# Patient Record
Sex: FEMALE | Race: WHITE | NOT HISPANIC OR LATINO | Employment: OTHER | URBAN - METROPOLITAN AREA
[De-identification: names, ages, dates, MRNs, and addresses within clinical notes are randomized per-mention and may not be internally consistent; named-entity substitution may affect disease eponyms.]

---

## 2018-05-22 DIAGNOSIS — I20.89 ANGINA EFFORT: Primary | ICD-10-CM

## 2018-05-23 ENCOUNTER — DOCUMENTATION ONLY (OUTPATIENT)
Dept: CARDIOLOGY | Facility: CLINIC | Age: 68
End: 2018-05-23

## 2018-05-23 ENCOUNTER — OFFICE VISIT (OUTPATIENT)
Dept: CARDIOLOGY | Facility: CLINIC | Age: 68
End: 2018-05-23
Payer: MEDICARE

## 2018-05-23 ENCOUNTER — INITIAL CONSULT (OUTPATIENT)
Dept: RADIATION ONCOLOGY | Facility: CLINIC | Age: 68
End: 2018-05-23
Payer: MEDICARE

## 2018-05-23 VITALS
WEIGHT: 189.63 LBS | DIASTOLIC BLOOD PRESSURE: 60 MMHG | OXYGEN SATURATION: 99 % | HEIGHT: 62 IN | SYSTOLIC BLOOD PRESSURE: 98 MMHG | BODY MASS INDEX: 34.89 KG/M2 | HEART RATE: 67 BPM

## 2018-05-23 VITALS
DIASTOLIC BLOOD PRESSURE: 60 MMHG | HEIGHT: 62 IN | BODY MASS INDEX: 34.78 KG/M2 | SYSTOLIC BLOOD PRESSURE: 98 MMHG | HEART RATE: 67 BPM | RESPIRATION RATE: 20 BRPM | WEIGHT: 189 LBS

## 2018-05-23 DIAGNOSIS — K21.9 GASTROESOPHAGEAL REFLUX DISEASE WITHOUT ESOPHAGITIS: ICD-10-CM

## 2018-05-23 DIAGNOSIS — I10 ESSENTIAL HYPERTENSION: ICD-10-CM

## 2018-05-23 DIAGNOSIS — I25.118 CORONARY ARTERY DISEASE OF NATIVE ARTERY OF NATIVE HEART WITH STABLE ANGINA PECTORIS: ICD-10-CM

## 2018-05-23 DIAGNOSIS — E78.5 DYSLIPIDEMIA: ICD-10-CM

## 2018-05-23 DIAGNOSIS — T82.855A CORONARY STENT RESTENOSIS, INITIAL ENCOUNTER: ICD-10-CM

## 2018-05-23 DIAGNOSIS — E11.8 TYPE 2 DIABETES MELLITUS WITH COMPLICATION, WITHOUT LONG-TERM CURRENT USE OF INSULIN: ICD-10-CM

## 2018-05-23 DIAGNOSIS — R07.9 CHEST PAIN, UNSPECIFIED TYPE: Primary | ICD-10-CM

## 2018-05-23 DIAGNOSIS — R06.00 DYSPNEA, UNSPECIFIED TYPE: ICD-10-CM

## 2018-05-23 PROBLEM — E11.9 TYPE 2 DIABETES MELLITUS, WITHOUT LONG-TERM CURRENT USE OF INSULIN: Status: ACTIVE | Noted: 2018-05-23

## 2018-05-23 PROCEDURE — 99202 OFFICE O/P NEW SF 15 MIN: CPT | Mod: S$PBB,,, | Performed by: RADIOLOGY

## 2018-05-23 PROCEDURE — 99204 OFFICE O/P NEW MOD 45 MIN: CPT | Mod: S$PBB,,, | Performed by: INTERNAL MEDICINE

## 2018-05-23 PROCEDURE — 99999 PR PBB SHADOW E&M-EST. PATIENT-LVL II: CPT | Mod: PBBFAC,,, | Performed by: RADIOLOGY

## 2018-05-23 PROCEDURE — 99999 PR PBB SHADOW E&M-EST. PATIENT-LVL III: CPT | Mod: PBBFAC,,, | Performed by: INTERNAL MEDICINE

## 2018-05-23 PROCEDURE — 99212 OFFICE O/P EST SF 10 MIN: CPT | Mod: PBBFAC | Performed by: RADIOLOGY

## 2018-05-23 PROCEDURE — 99213 OFFICE O/P EST LOW 20 MIN: CPT | Mod: PBBFAC,27 | Performed by: INTERNAL MEDICINE

## 2018-05-23 RX ORDER — GABAPENTIN 300 MG/1
300 CAPSULE ORAL 3 TIMES DAILY PRN
COMMUNITY

## 2018-05-23 RX ORDER — GLUC/MSM/COLGN2/HYAL/ANTIARTH3 375-375-20
TABLET ORAL
COMMUNITY

## 2018-05-23 RX ORDER — METOPROLOL SUCCINATE 50 MG/1
TABLET, EXTENDED RELEASE ORAL
COMMUNITY

## 2018-05-23 RX ORDER — LISINOPRIL AND HYDROCHLOROTHIAZIDE 10; 12.5 MG/1; MG/1
TABLET ORAL
COMMUNITY

## 2018-05-23 RX ORDER — ISOSORBIDE MONONITRATE 30 MG/1
60 TABLET, EXTENDED RELEASE ORAL DAILY
COMMUNITY

## 2018-05-23 RX ORDER — ISOSORBIDE MONONITRATE 60 MG/1
TABLET, EXTENDED RELEASE ORAL
COMMUNITY

## 2018-05-23 RX ORDER — ATORVASTATIN CALCIUM 40 MG/1
TABLET, FILM COATED ORAL
COMMUNITY

## 2018-05-23 RX ORDER — FLUOXETINE HYDROCHLORIDE 40 MG/1
CAPSULE ORAL
COMMUNITY

## 2018-05-23 RX ORDER — NITROGLYCERIN 0.4 MG/1
TABLET SUBLINGUAL
COMMUNITY

## 2018-05-23 RX ORDER — ALPRAZOLAM 0.25 MG/1
TABLET ORAL
COMMUNITY

## 2018-05-23 RX ORDER — TRAMADOL HYDROCHLORIDE 50 MG/1
TABLET ORAL
COMMUNITY

## 2018-05-23 RX ORDER — RANOLAZINE 500 MG/1
TABLET, EXTENDED RELEASE ORAL
COMMUNITY

## 2018-05-23 RX ORDER — ASPIRIN/OMEPRAZOLE 81 MG-40MG
TABLET,IMMEDIATE,DELAY RELEASE,BIPHASE ORAL
COMMUNITY

## 2018-05-23 RX ORDER — LISINOPRIL 10 MG/1
1 TABLET ORAL
COMMUNITY
Start: 2016-12-20 | End: 2018-05-23

## 2018-05-23 RX ORDER — LYSINE HCL 500 MG
TABLET ORAL
COMMUNITY

## 2018-05-23 RX ORDER — ALENDRONATE SODIUM 70 MG/1
TABLET ORAL
COMMUNITY

## 2018-05-23 RX ORDER — ALBUTEROL SULFATE 90 UG/1
AEROSOL, METERED RESPIRATORY (INHALATION)
COMMUNITY

## 2018-05-23 RX ORDER — CETIRIZINE HYDROCHLORIDE 10 MG/1
10 TABLET ORAL
COMMUNITY

## 2018-05-23 NOTE — LETTER
May 23, 2018      Lyric Vidal MD  [address] [place] [address]           [facility]  [address]  [facility] [address]  Phone: [phone]          Patient: Maureen Limon   MR Number: [id number]   YOB: 1950   Date of Visit: 5/23/2018       Dear Dr. Lyric Vidal:    Thank you for referring Maureen Limon to me for evaluation. Attached you will find relevant portions of my assessment and plan of care.    If you have questions, please do not hesitate to call me. I look forward to following Maureen Limon along with you.    Sincerely,    Samuel Robertson MD    Enclosure  CC:  No Recipients    If you would like to receive this communication electronically, please contact [email or web address] or [phone] to request more information on [employer] Link access.    For providers and/or their staff who would like to refer a patient to [facility], please contact us through our one-stop-shop provider referral line, [facility], at [phone].    If you feel you have received this communication in error or would no longer like to receive these types of communications, please e-mail [email or web address]

## 2018-05-23 NOTE — PROGRESS NOTES
OUTPATIENT CATHETERIZATION INSTRUCTIONS    You have been scheduled for a procedure in the catheterization lab on Friday, May 25,  2018.     Please report to the Cardiology Waiting Area on the Third floor of the hospital and check in at 9 AM.   You will then be taken to the SSCU (Short Stay Cardiac Unit) and prepared for your procedure. Please be aware that this is not the time of your procedure but the time you are to arrive. The procedures are scheduled on an hourly basis; however, emergency cases take precedence over all other cases.       You may not have anything to eat or drink after midnight the night before your test. You may take your regular morning medications with water. If there are any medications that you should not take you will be instructed to hold them that morning. If you are diabetic and on Metformin (Glucophage) do not take it the day before, the day of, and for 2 days after your procedure.      The procedure will take 1-2 hours to perform. After the procedure, you will return to SSCU on the third floor of the hospital. You will need to lie still (or keep your arm still) for the next 4 to 6 hours to minimize bleeding from the puncture site. Your family may remain in the room with you during this time.       You may be able to be discharged home that same afternoon if there is someone to drive you home and there were no complications. If you have one of the balloon, stent, or device procedures you may spend the night in the hospital. Your doctor will determine, based on your progress, the date and time of your discharge. The results of your procedure will be discussed with you before you are discharged. Any further testing or procedures will be scheduled for you either before you leave or you will be called with these appointments.       If you should have any questions, concerns, or need to change the date of your procedure, please call  SERGE Burgos @ [phone]    Special  Instructions:  none        THE ABOVE INSTRUCTIONS WERE GIVEN TO THE PATIENT VERBALLY AND THEY VERBALIZED UNDERSTANDING.  THEY DO NOT REQUIRE ANY SPECIAL NEEDS AND DO NOT HAVE ANY LEARNING BARRIERS.          Directions for Reporting to Cardiology Waiting Area in the Hospital  If you park in the Parking Garage:  Take elevators to the1st floor of the parking garage.  Continue past the gift shop, coffee shop, and piano.  Take a right and go to the gold elevators. (Elevator B)  Take the elevator to the 3rd floor.  Follow the arrow on the sign on the wall that says Cath Lab Registration/EP Lab Registration.  Follow the long hallway all the way around until you come to a big open area.  This is the registration area.  Check in at Reception Desk.    OR    If family is dropping you off:  Have them drop you off at the front of the Hospital under the green overhang.  Enter through the doors and take a right.  Take the E elevators to the 3rd floor Cardiology Waiting Area.  Check in at the Reception Desk in the waiting room.

## 2018-05-23 NOTE — LETTER
May 23, 2018      Samuel Robertson MD  [address]  [facility] [address]           [facility]  [address]  [facility] [address]  Phone: [phone]          Patient: Maureen Limon   MR Number: [id number]   YOB: 1950   Date of Visit: 5/23/2018       Dear Dr. Samuel Robertson:    Thank you for referring Maureen Limon to me for evaluation. Attached you will find relevant portions of my assessment and plan of care.    If you have questions, please do not hesitate to call me. I look forward to following Maureen Limon along with you.    Sincerely,    Elver Jha MD    Enclosure  CC:  No Recipients    If you would like to receive this communication electronically, please contact [email or web address] or [phone] to request more information on [employer] Link access.    For providers and/or their staff who would like to refer a patient to [facility], please contact us through our one-stop-shop provider referral line, [facility], at [phone].    If you feel you have received this communication in error or would no longer like to receive these types of communications, please e-mail [email or web address]

## 2018-05-23 NOTE — PROGRESS NOTES
Subjective:       Patient ID: Maureen Limon is a 68 y.o. female.    Chief Complaint: cardiac stenosis (consent)    HPI:  This patient is referred for consultation regarding cardiac brachytherapy for prevention of instent restenosis.      Maureen Limon is a 68 y.o. female with a history of CAD and is status post CABG and recent angioplasty with coronary artery stent placement.  The patient has a history of instent restenosis diagnosed at an outside hospital.  The patient saw Dr. Robertson this morning, and  we are consulted for consideration of cardiac brachytherapy for prevention of restenosis.    Today the patient is here with her daughter.  She feels ok and is without chest pain with sitting, but has mild chest pain with ambulation.  No SOB or diaphoresis.        Objective:      Physical Exam:  Patient is alert and is oriented to person, place, and time. She appears well-developed and well-nourished.  LUNGS: CTAB  CV: RRR, no m/r/g  NEURO: nonfocal.  Gait is normal.      Assessment/Plan:       History of CAD with instent restenosis.  Discussed the rationale for cardiac brachytherapy in this setting.  Discussed the procedures, risks and benefits of therapy.  The patient asked excellent questions and wishes to proceed as recommended.  Consent form was signed.  Procedure is scheduled for 5/25/18.  Will proceed with treatment if appropriate.   15 minutes was spent in consultation, of which >50% was spent in face to face counseling.

## 2018-05-24 NOTE — PROGRESS NOTES
"Subjective:    Patient ID:  Maureen Limon is a 68 y.o. female who presents for follow-up of Coronary Artery Disease    Referring cardiologist: Dr. Marcy WESTFALL  Mrs. Limon is a 69 y/o woman with a h/o DM2, HTN, dyslipidemia, and CAD.  She is s/p CABG (LIMA to LAD, SVG to D1, SVG to OM1, SVG to PDA).  The patient reports a h/o exertional fatigue and angina last May that was relieved after she underwent PCI to the LM and proximal LAD.  She reports that she was free of chest pain until the 1st of May 2018 when she began experiencing chest pain that sometimes radiates to both arms.  The patient reports she cannot be more specific about this symptom. She states at present her chest pain occurs every day and is almost continuous.  She reports partial relief with NTG SL and partial relief with leaning forward in a seated position. Her chest pain is exacerbated by walking 300-400 yards or picking up her 2 year old grandson. It is associated with shortness of breath and is associated with the sensation of feeling "warm all over", but not diaphoresis. It is occasionally associated with nausea, but never vomiting. The patient denies LE edema, orthopnea, or PND.  However she reports sleeping on 2 pillow for the last month for comfort.  She denies palpitations, syncope or near syncope.    Past Medical History:   Diagnosis Date    Atrial flutter     Coronary artery disease     GERD (gastroesophageal reflux disease)     Hyperlipidemia     Hypertension     Pneumonia      Past Surgical History:   Procedure Laterality Date    CARDIAC SURGERY      CORONARY ARTERY BYPASS GRAFT         Current Outpatient Prescriptions:     albuterol (VENTOLIN HFA) 90 mcg/actuation inhaler, Ventolin HFA 90 mcg/actuation aerosol inhaler, Disp: , Rfl:     alendronate (FOSAMAX) 70 MG tablet, alendronate 70 mg tablet, Disp: , Rfl:     ALPRAZolam (XANAX) 0.25 MG tablet, alprazolam 0.25 mg tablet, Disp: , Rfl:     aspirin-omeprazole " (YOSPRALA) 81-40 mg TbID, Yosprala 81 mg-40 mg tablet,immediate and delay release  Take 1 tablet every day by oral route., Disp: , Rfl:     atorvastatin (LIPITOR) 40 MG tablet, atorvastatin 40 mg tablet  1 po qd, Disp: , Rfl:     gabapentin (NEURONTIN) 300 MG capsule, Take 300 mg by mouth 3 (three) times daily as needed. , Disp: , Rfl:     isosorbide mononitrate (IMDUR) 30 MG 24 hr tablet, Take 60 mg by mouth once daily. , Disp: , Rfl:     lisinopril-hydrochlorothiazide (PRINZIDE,ZESTORETIC) 10-12.5 mg per tablet, lisinopril 10 mg-hydrochlorothiazide 12.5 mg tablet, Disp: , Rfl:     metoprolol tartrate (LOPRESSOR) 25 MG tablet, Take 50 mg by mouth once daily. HALF , Disp: , Rfl:     nitroGLYCERIN (NITROSTAT) 0.4 MG SL tablet, nitroglycerin 0.4 mg sublingual tablet  Place 1 tablet as needed by sublingual route., Disp: , Rfl:     SITagliptan-metformin (JANUMET XR)  mg TM24, Janumet XR 50 mg-500 mg tablet,extended release, Disp: , Rfl:     ticagrelor (BRILINTA) 90 mg tablet, Brilinta 90 mg tablet  TAKE ONE TABLET BY MOUTH TWICE DAILY, Disp: , Rfl:     calcium carbonate-vit D3-min 600 mg calcium- 400 unit Tab, Take by mouth., Disp: , Rfl:     cetirizine (ZYRTEC) 10 MG tablet, Take 10 mg by mouth., Disp: , Rfl:     ferrous gluconate (FERGON) 324 MG tablet, Take 324 mg by mouth daily with breakfast., Disp: , Rfl:     ferrous gluconate 236 mg (27 mg iron) Tab, ferrous gluconate  324mg 1 po qd, Disp: , Rfl:     FLUoxetine (PROZAC) 40 MG capsule, fluoxetine 40 mg capsule, Disp: , Rfl:     isosorbide mononitrate (IMDUR) 60 MG 24 hr tablet, isosorbide mononitrate ER 60 mg tablet,extended release 24 hr  Take 1 tablet every day by oral route., Disp: , Rfl:     metoprolol succinate (TOPROL XL) 50 MG 24 hr tablet, Toprol XL 50 mg tablet,extended release  Take 1 tablet every day by oral route., Disp: , Rfl:     ranolazine (RANEXA) 500 MG Tb12, Ranexa 500 mg tablet,extended release  Take 1 tablet twice a day by  "oral route., Disp: , Rfl:     traMADol (ULTRAM) 50 mg tablet, tramadol 50 mg tablet, Disp: , Rfl:     Review of patient's allergies indicates:   Allergen Reactions    Codeine Nausea And Vomiting    Zofran [ondansetron hcl (pf)] Nausea And Vomiting               Review of Systems   Constitution: Positive for malaise/fatigue. Negative for diaphoresis, fever, weakness and weight gain.   HENT: Negative for congestion, hearing loss, nosebleeds and sore throat.    Eyes: Negative for visual disturbance.   Cardiovascular: Positive for chest pain and dyspnea on exertion. Negative for claudication, irregular heartbeat, leg swelling, near-syncope, orthopnea, palpitations, paroxysmal nocturnal dyspnea and syncope.   Respiratory: Negative for cough, hemoptysis, shortness of breath and wheezing.    Endocrine: Negative for polyuria.   Hematologic/Lymphatic: Negative for bleeding problem. Does not bruise/bleed easily.   Skin: Negative for poor wound healing and rash.   Musculoskeletal: Negative for myalgias.   Gastrointestinal: Negative for abdominal pain, change in bowel habit, constipation, diarrhea, dysphagia, heartburn, hematemesis, melena, nausea and vomiting.   Genitourinary: Negative for bladder incontinence and dysuria.   Neurological: Negative for focal weakness and headaches.   Psychiatric/Behavioral: Negative for depression.   Allergic/Immunologic: Negative for hives and persistent infections.        Objective:  Vitals:    05/23/18 0835 05/23/18 0847   BP: 121/63 98/60   BP Location: Right arm Left arm   Patient Position: Sitting Sitting   BP Method: Large (Automatic) Large (Automatic)   Pulse: 67 67   SpO2: 99%    Weight: 86 kg (189 lb 9.5 oz)    Height: 5' 2" (1.575 m)          Physical Exam   Constitutional: She appears well-developed and well-nourished.   HENT:   Head: Normocephalic and atraumatic.   Eyes: EOM are normal. Pupils are equal, round, and reactive to light. Right eye exhibits no discharge. No scleral " icterus.   Neck: No JVD present. No thyromegaly present.   Cardiovascular: Normal rate and regular rhythm.  PMI is not displaced.  Exam reveals no gallop.    No murmur heard.  Pulses:       Carotid pulses are 2+ on the right side, and 2+ on the left side.       Radial pulses are 2+ on the right side, and 2+ on the left side.        Femoral pulses are 2+ on the right side, and 2+ on the left side.       Dorsalis pedis pulses are 1+ on the right side, and 1+ on the left side.        Posterior tibial pulses are 1+ on the right side, and 1+ on the left side.   Pulmonary/Chest: Effort normal and breath sounds normal.   Abdominal: Soft. Bowel sounds are normal. There is no hepatosplenomegaly. There is no tenderness.   Lymphadenopathy:     She has no cervical adenopathy.   Neurological: She is alert. Gait normal.   Skin: Skin is warm, dry and intact. She is not diaphoretic.   Psychiatric: She has a normal mood and affect.         Assessment:       1. Coronary artery disease of native artery of native heart with stable angina pectoris    2. Dyslipidemia    3. Essential hypertension    4. Type 2 diabetes mellitus with complication, without long-term current use of insulin    5. Gastroesophageal reflux disease without esophagitis         Plan:       1) Chest pain syndrome. The patient reports a chest pain syndrome that has some feature consistent with typical angina and some features that are atypical. She is clear that her chest pain syndrome resolved after she underwent LM and proximal LAD PCI in May 2017 and that her symptoms recurred on May 1st, 2018.  Her 5/17/18 coronary angiogram from [facility] demonstrated ISR of the LM and LAD stents (also LIMA to LAD was atretic, but SVG to D1, SVG to OM1, and SVG to PDA were all patent). I explained to the patient that her chest pain syndrome is likely multi-factorial in etiology.  The fact that she experienced relief of chest pain after her May 2017 PCI and  recurrence of her chest pain that temporally corollates with the development of ISR of her LM and proximal LAD stents makes I likely that obstructive CAD may be at least partially responsible for her chest pain syndrome. I therefore discussed PCi with possibel brachytherapy for the treatment of her LM and proximal LAD ISR.  After discussing the potential benefit and risks, the patient stated she would like to proceed with cardiac catheterization and possible PCI. Will also check TSH.  6Fr R CFA access  The risks, benefits, and alternatives of cardiac catheterization and possible intervention were discussed with the patient.  All questions were answered and informed consent was obtained.  Will refer the patient to radiation oncology for brachytherapy consultation  -continue aspirin-omeprazole 81-40 mg po qday  -continue ticagrelor 90mg po BID  -continue Toprol 50mg po qday  -continue Ranexa 500mg po BID  -continue Imdur 60mg po qday    2) DM2. rec tight glycemic control; I explained to the patient that DM2 is a risk factor for the development of ISR    3) HTN. The patient's blood pressure was adequately controlled in clinic today; continue current medications    4) Dyslipidemia. Continue statin    5) CKD3. Creatinine today is 1.0, but eGFR is 58.4; as patient underwent angiography on 5/21/18, will check BMP on day of angiogram; oral hydration the day prior to cath; IV hydration the day of cath    6) H/o GERD. Continue PPI    All of the patient's and her granddaughter's questions were answered.

## 2018-05-25 ENCOUNTER — HOSPITAL ENCOUNTER (OUTPATIENT)
Facility: HOSPITAL | Age: 68
Discharge: HOME | End: 2018-05-26
Attending: INTERNAL MEDICINE | Admitting: INTERNAL MEDICINE
Payer: MEDICARE

## 2018-05-25 DIAGNOSIS — I25.10 ATHEROSCLEROTIC HEART DISEASE OF NATIVE CORONARY ARTERY WITHOUT ANGINA PECTORIS: ICD-10-CM

## 2018-05-25 DIAGNOSIS — I25.10 ATHEROSCLEROSIS OF NATIVE CORONARY ARTERY OF NATIVE HEART WITHOUT ANGINA PECTORIS: ICD-10-CM

## 2018-05-25 DIAGNOSIS — Z98.61 POSTSURGICAL PERCUTANEOUS TRANSLUMINAL CORONARY ANGIOPLASTY STATUS: Primary | ICD-10-CM

## 2018-05-25 DIAGNOSIS — I25.118 ATHEROSCLEROTIC HEART DISEASE OF NATIVE CORONARY ARTERY WITH OTHER FORMS OF ANGINA PECTORIS: ICD-10-CM

## 2018-05-25 DIAGNOSIS — I25.118 CORONARY ARTERY DISEASE OF NATIVE ARTERY OF NATIVE HEART WITH STABLE ANGINA PECTORIS: Primary | ICD-10-CM

## 2018-05-25 LAB
A1 AG RBC QL: NORMAL
ABO + RH BLD: NORMAL
BLD GP AB SCN CELLS X3 SERPL QL: NORMAL
BLOOD GROUP ANTIBODIES SERPL: NORMAL
CORONARY STENOSIS: ABNORMAL
POCT GLUCOSE: 111 MG/DL (ref 70–110)
POCT GLUCOSE: 112 MG/DL (ref 70–110)

## 2018-05-25 PROCEDURE — 86901 BLOOD TYPING SEROLOGIC RH(D): CPT

## 2018-05-25 PROCEDURE — 92974 CATH PLACE CARDIO BRACHYTX: CPT | Mod: GC,,, | Performed by: INTERNAL MEDICINE

## 2018-05-25 PROCEDURE — 77470 SPECIAL RADIATION TREATMENT: CPT | Mod: TC | Performed by: RADIOLOGY

## 2018-05-25 PROCEDURE — 25000003 PHARM REV CODE 250: Performed by: INTERNAL MEDICINE

## 2018-05-25 PROCEDURE — 77290 THER RAD SIMULAJ FIELD CPLX: CPT | Mod: TC | Performed by: RADIOLOGY

## 2018-05-25 PROCEDURE — 82962 GLUCOSE BLOOD TEST: CPT

## 2018-05-25 PROCEDURE — 77290 THER RAD SIMULAJ FIELD CPLX: CPT | Mod: 26,,, | Performed by: RADIOLOGY

## 2018-05-25 PROCEDURE — 77370 RADIATION PHYSICS CONSULT: CPT | Performed by: RADIOLOGY

## 2018-05-25 PROCEDURE — 92978 ENDOLUMINL IVUS OCT C 1ST: CPT | Mod: 26,GC,, | Performed by: INTERNAL MEDICINE

## 2018-05-25 PROCEDURE — 99152 MOD SED SAME PHYS/QHP 5/>YRS: CPT | Mod: GC,,, | Performed by: INTERNAL MEDICINE

## 2018-05-25 PROCEDURE — 92920 PRQ TRLUML C ANGIOP 1ART&/BR: CPT | Mod: LM,GC,, | Performed by: INTERNAL MEDICINE

## 2018-05-25 PROCEDURE — 25000003 PHARM REV CODE 250: Performed by: STUDENT IN AN ORGANIZED HEALTH CARE EDUCATION/TRAINING PROGRAM

## 2018-05-25 PROCEDURE — 25000003 PHARM REV CODE 250

## 2018-05-25 PROCEDURE — 27000014 CATH LAB PROCEDURE

## 2018-05-25 PROCEDURE — 86870 RBC ANTIBODY IDENTIFICATION: CPT

## 2018-05-25 PROCEDURE — 86905 BLOOD TYPING RBC ANTIGENS: CPT

## 2018-05-25 PROCEDURE — 77770 HDR RDNCL NTRSTL/ICAV BRCHTX: CPT | Mod: 26,,, | Performed by: RADIOLOGY

## 2018-05-25 PROCEDURE — 93452 LEFT HRT CATH W/VENTRCLGRPHY: CPT | Mod: 26,51,GC, | Performed by: INTERNAL MEDICINE

## 2018-05-25 PROCEDURE — 93010 ELECTROCARDIOGRAM REPORT: CPT | Mod: ,,, | Performed by: INTERNAL MEDICINE

## 2018-05-25 PROCEDURE — 77470 SPECIAL RADIATION TREATMENT: CPT | Mod: 26,,, | Performed by: RADIOLOGY

## 2018-05-25 PROCEDURE — 77263 THER RADIOLOGY TX PLNG CPLX: CPT | Mod: ,,, | Performed by: RADIOLOGY

## 2018-05-25 PROCEDURE — C1725 CATH, TRANSLUMIN NON-LASER: HCPCS

## 2018-05-25 PROCEDURE — 77770 HDR RDNCL NTRSTL/ICAV BRCHTX: CPT | Mod: TC | Performed by: RADIOLOGY

## 2018-05-25 PROCEDURE — 63600175 PHARM REV CODE 636 W HCPCS

## 2018-05-25 PROCEDURE — 93005 ELECTROCARDIOGRAM TRACING: CPT | Mod: 59

## 2018-05-25 RX ORDER — RAMELTEON 8 MG/1
8 TABLET ORAL ONCE
Status: COMPLETED | OUTPATIENT
Start: 2018-05-25 | End: 2018-05-25

## 2018-05-25 RX ORDER — ATORVASTATIN CALCIUM 20 MG/1
40 TABLET, FILM COATED ORAL DAILY
Status: DISCONTINUED | OUTPATIENT
Start: 2018-05-26 | End: 2018-05-26 | Stop reason: HOSPADM

## 2018-05-25 RX ORDER — METOPROLOL SUCCINATE 50 MG/1
50 TABLET, EXTENDED RELEASE ORAL DAILY
Status: DISCONTINUED | OUTPATIENT
Start: 2018-05-26 | End: 2018-05-26 | Stop reason: HOSPADM

## 2018-05-25 RX ORDER — IBUPROFEN 200 MG
16 TABLET ORAL
Status: DISCONTINUED | OUTPATIENT
Start: 2018-05-25 | End: 2018-05-26 | Stop reason: HOSPADM

## 2018-05-25 RX ORDER — ACETAMINOPHEN 325 MG/1
650 TABLET ORAL EVERY 4 HOURS PRN
Status: DISCONTINUED | OUTPATIENT
Start: 2018-05-25 | End: 2018-05-26 | Stop reason: HOSPADM

## 2018-05-25 RX ORDER — INSULIN ASPART 100 [IU]/ML
0-5 INJECTION, SOLUTION INTRAVENOUS; SUBCUTANEOUS
Status: DISCONTINUED | OUTPATIENT
Start: 2018-05-25 | End: 2018-05-26 | Stop reason: HOSPADM

## 2018-05-25 RX ORDER — PANTOPRAZOLE SODIUM 40 MG/1
40 TABLET, DELAYED RELEASE ORAL DAILY
Status: DISCONTINUED | OUTPATIENT
Start: 2018-05-26 | End: 2018-05-26 | Stop reason: HOSPADM

## 2018-05-25 RX ORDER — RAMELTEON 8 MG/1
8 TABLET ORAL NIGHTLY PRN
Status: DISCONTINUED | OUTPATIENT
Start: 2018-05-25 | End: 2018-05-25

## 2018-05-25 RX ORDER — LISINOPRIL AND HYDROCHLOROTHIAZIDE 10; 12.5 MG/1; MG/1
1 TABLET ORAL DAILY
Status: DISCONTINUED | OUTPATIENT
Start: 2018-05-26 | End: 2018-05-26 | Stop reason: HOSPADM

## 2018-05-25 RX ORDER — GLUCAGON 1 MG
1 KIT INJECTION
Status: DISCONTINUED | OUTPATIENT
Start: 2018-05-25 | End: 2018-05-26 | Stop reason: HOSPADM

## 2018-05-25 RX ORDER — IBUPROFEN 200 MG
24 TABLET ORAL
Status: DISCONTINUED | OUTPATIENT
Start: 2018-05-25 | End: 2018-05-26 | Stop reason: HOSPADM

## 2018-05-25 RX ORDER — RANOLAZINE 500 MG/1
500 TABLET, EXTENDED RELEASE ORAL 2 TIMES DAILY
Status: DISCONTINUED | OUTPATIENT
Start: 2018-05-25 | End: 2018-05-26 | Stop reason: HOSPADM

## 2018-05-25 RX ORDER — DIPHENHYDRAMINE HCL 50 MG
50 CAPSULE ORAL ONCE
Status: COMPLETED | OUTPATIENT
Start: 2018-05-25 | End: 2018-05-25

## 2018-05-25 RX ORDER — GABAPENTIN 300 MG/1
300 CAPSULE ORAL 3 TIMES DAILY PRN
Status: DISCONTINUED | OUTPATIENT
Start: 2018-05-25 | End: 2018-05-26 | Stop reason: HOSPADM

## 2018-05-25 RX ORDER — ASPIRIN 81 MG/1
81 TABLET ORAL DAILY
Status: DISCONTINUED | OUTPATIENT
Start: 2018-05-26 | End: 2018-05-26 | Stop reason: HOSPADM

## 2018-05-25 RX ORDER — ISOSORBIDE MONONITRATE 60 MG/1
60 TABLET, EXTENDED RELEASE ORAL DAILY
Status: DISCONTINUED | OUTPATIENT
Start: 2018-05-26 | End: 2018-05-26 | Stop reason: HOSPADM

## 2018-05-25 RX ORDER — SODIUM CHLORIDE 9 MG/ML
3 INJECTION, SOLUTION INTRAVENOUS CONTINUOUS
Status: ACTIVE | OUTPATIENT
Start: 2018-05-25 | End: 2018-05-25

## 2018-05-25 RX ADMIN — ACETAMINOPHEN 650 MG: 325 TABLET ORAL at 10:05

## 2018-05-25 RX ADMIN — RAMELTEON 8 MG: 8 TABLET, FILM COATED ORAL at 10:05

## 2018-05-25 RX ADMIN — SODIUM CHLORIDE 3 ML/KG/HR: 0.9 INJECTION, SOLUTION INTRAVENOUS at 10:05

## 2018-05-25 RX ADMIN — DIPHENHYDRAMINE HYDROCHLORIDE 50 MG: 50 CAPSULE ORAL at 10:05

## 2018-05-25 RX ADMIN — TICAGRELOR 90 MG: 90 TABLET ORAL at 08:05

## 2018-05-25 RX ADMIN — GABAPENTIN 300 MG: 300 CAPSULE ORAL at 08:05

## 2018-05-25 NOTE — BRIEF OP NOTE
"    Post Cath Note  Referring Physician: Samuel Robertson MD  Procedure: [id number] (PTCA) (N/A), BRACHYTHERAPY (N/A)       Access: Right CFA    LVEDP 15 mmHg    See full report for further details    Intervention:     Successful POBA and brachytherapy to LM and LAD    Closure device: Mynx    Post Cath Exam:   /61 (BP Location: Right arm, Patient Position: Lying)   Pulse 71   Temp 98 °F (36.7 °C) (Oral)   Resp 18   Ht 5' 2" (1.575 m)   Wt 83.9 kg (185 lb)   SpO2 99%   Breastfeeding? No   BMI 33.84 kg/m²   No unusual pain, hematoma, thrill or bruit at vascular access site.  Distal pulse present without signs of ischemia.    Recommendations:   - Routine post-cath care  - IVF at 3 cc/kg/hr for 4 hrs  - Cardiac rehab referral  - Continue medical management  - Risk factor reduction  - Ticagrelor for at least 1 year and ASA 81 mg indefinitely  - Follow-up with outpatient cardiologist    Signed:  Gerson Mcknight MD  Cardiology Fellow, PGY-5  5/25/2018 1:28 PM  "

## 2018-05-25 NOTE — H&P (VIEW-ONLY)
"Subjective:    Patient ID:  Maureen Limon is a 68 y.o. female who presents for follow-up of Coronary Artery Disease    Referring cardiologist: Dr. Marcy WESTFALL  Mrs. Limon is a 69 y/o woman with a h/o DM2, HTN, dyslipidemia, and CAD.  She is s/p CABG (LIMA to LAD, SVG to D1, SVG to OM1, SVG to PDA).  The patient reports a h/o exertional fatigue and angina last May that was relieved after she underwent PCI to the LM and proximal LAD.  She reports that she was free of chest pain until the 1st of May 2018 when she began experiencing chest pain that sometimes radiates to both arms.  The patient reports she cannot be more specific about this symptom. She states at present her chest pain occurs every day and is almost continuous.  She reports partial relief with NTG SL and partial relief with leaning forward in a seated position. Her chest pain is exacerbated by walking 300-400 yards or picking up her 2 year old grandson. It is associated with shortness of breath and is associated with the sensation of feeling "warm all over", but not diaphoresis. It is occasionally associated with nausea, but never vomiting. The patient denies LE edema, orthopnea, or PND.  However she reports sleeping on 2 pillow for the last month for comfort.  She denies palpitations, syncope or near syncope.    Past Medical History:   Diagnosis Date    Atrial flutter     Coronary artery disease     GERD (gastroesophageal reflux disease)     Hyperlipidemia     Hypertension     Pneumonia      Past Surgical History:   Procedure Laterality Date    CARDIAC SURGERY      CORONARY ARTERY BYPASS GRAFT         Current Outpatient Prescriptions:     albuterol (VENTOLIN HFA) 90 mcg/actuation inhaler, Ventolin HFA 90 mcg/actuation aerosol inhaler, Disp: , Rfl:     alendronate (FOSAMAX) 70 MG tablet, alendronate 70 mg tablet, Disp: , Rfl:     ALPRAZolam (XANAX) 0.25 MG tablet, alprazolam 0.25 mg tablet, Disp: , Rfl:     aspirin-omeprazole " (YOSPRALA) 81-40 mg TbID, Yosprala 81 mg-40 mg tablet,immediate and delay release  Take 1 tablet every day by oral route., Disp: , Rfl:     atorvastatin (LIPITOR) 40 MG tablet, atorvastatin 40 mg tablet  1 po qd, Disp: , Rfl:     gabapentin (NEURONTIN) 300 MG capsule, Take 300 mg by mouth 3 (three) times daily as needed. , Disp: , Rfl:     isosorbide mononitrate (IMDUR) 30 MG 24 hr tablet, Take 60 mg by mouth once daily. , Disp: , Rfl:     lisinopril-hydrochlorothiazide (PRINZIDE,ZESTORETIC) 10-12.5 mg per tablet, lisinopril 10 mg-hydrochlorothiazide 12.5 mg tablet, Disp: , Rfl:     metoprolol tartrate (LOPRESSOR) 25 MG tablet, Take 50 mg by mouth once daily. HALF , Disp: , Rfl:     nitroGLYCERIN (NITROSTAT) 0.4 MG SL tablet, nitroglycerin 0.4 mg sublingual tablet  Place 1 tablet as needed by sublingual route., Disp: , Rfl:     SITagliptan-metformin (JANUMET XR)  mg TM24, Janumet XR 50 mg-500 mg tablet,extended release, Disp: , Rfl:     ticagrelor (BRILINTA) 90 mg tablet, Brilinta 90 mg tablet  TAKE ONE TABLET BY MOUTH TWICE DAILY, Disp: , Rfl:     calcium carbonate-vit D3-min 600 mg calcium- 400 unit Tab, Take by mouth., Disp: , Rfl:     cetirizine (ZYRTEC) 10 MG tablet, Take 10 mg by mouth., Disp: , Rfl:     ferrous gluconate (FERGON) 324 MG tablet, Take 324 mg by mouth daily with breakfast., Disp: , Rfl:     ferrous gluconate 236 mg (27 mg iron) Tab, ferrous gluconate  324mg 1 po qd, Disp: , Rfl:     FLUoxetine (PROZAC) 40 MG capsule, fluoxetine 40 mg capsule, Disp: , Rfl:     isosorbide mononitrate (IMDUR) 60 MG 24 hr tablet, isosorbide mononitrate ER 60 mg tablet,extended release 24 hr  Take 1 tablet every day by oral route., Disp: , Rfl:     metoprolol succinate (TOPROL XL) 50 MG 24 hr tablet, Toprol XL 50 mg tablet,extended release  Take 1 tablet every day by oral route., Disp: , Rfl:     ranolazine (RANEXA) 500 MG Tb12, Ranexa 500 mg tablet,extended release  Take 1 tablet twice a day by  "oral route., Disp: , Rfl:     traMADol (ULTRAM) 50 mg tablet, tramadol 50 mg tablet, Disp: , Rfl:     Review of patient's allergies indicates:   Allergen Reactions    Codeine Nausea And Vomiting    Zofran [ondansetron hcl (pf)] Nausea And Vomiting               Review of Systems   Constitution: Positive for malaise/fatigue. Negative for diaphoresis, fever, weakness and weight gain.   HENT: Negative for congestion, hearing loss, nosebleeds and sore throat.    Eyes: Negative for visual disturbance.   Cardiovascular: Positive for chest pain and dyspnea on exertion. Negative for claudication, irregular heartbeat, leg swelling, near-syncope, orthopnea, palpitations, paroxysmal nocturnal dyspnea and syncope.   Respiratory: Negative for cough, hemoptysis, shortness of breath and wheezing.    Endocrine: Negative for polyuria.   Hematologic/Lymphatic: Negative for bleeding problem. Does not bruise/bleed easily.   Skin: Negative for poor wound healing and rash.   Musculoskeletal: Negative for myalgias.   Gastrointestinal: Negative for abdominal pain, change in bowel habit, constipation, diarrhea, dysphagia, heartburn, hematemesis, melena, nausea and vomiting.   Genitourinary: Negative for bladder incontinence and dysuria.   Neurological: Negative for focal weakness and headaches.   Psychiatric/Behavioral: Negative for depression.   Allergic/Immunologic: Negative for hives and persistent infections.        Objective:  Vitals:    05/23/18 0835 05/23/18 0847   BP: 121/63 98/60   BP Location: Right arm Left arm   Patient Position: Sitting Sitting   BP Method: Large (Automatic) Large (Automatic)   Pulse: 67 67   SpO2: 99%    Weight: 86 kg (189 lb 9.5 oz)    Height: 5' 2" (1.575 m)          Physical Exam   Constitutional: She appears well-developed and well-nourished.   HENT:   Head: Normocephalic and atraumatic.   Eyes: EOM are normal. Pupils are equal, round, and reactive to light. Right eye exhibits no discharge. No scleral " icterus.   Neck: No JVD present. No thyromegaly present.   Cardiovascular: Normal rate and regular rhythm.  PMI is not displaced.  Exam reveals no gallop.    No murmur heard.  Pulses:       Carotid pulses are 2+ on the right side, and 2+ on the left side.       Radial pulses are 2+ on the right side, and 2+ on the left side.        Femoral pulses are 2+ on the right side, and 2+ on the left side.       Dorsalis pedis pulses are 1+ on the right side, and 1+ on the left side.        Posterior tibial pulses are 1+ on the right side, and 1+ on the left side.   Pulmonary/Chest: Effort normal and breath sounds normal.   Abdominal: Soft. Bowel sounds are normal. There is no hepatosplenomegaly. There is no tenderness.   Lymphadenopathy:     She has no cervical adenopathy.   Neurological: She is alert. Gait normal.   Skin: Skin is warm, dry and intact. She is not diaphoretic.   Psychiatric: She has a normal mood and affect.         Assessment:       1. Coronary artery disease of native artery of native heart with stable angina pectoris    2. Dyslipidemia    3. Essential hypertension    4. Type 2 diabetes mellitus with complication, without long-term current use of insulin    5. Gastroesophageal reflux disease without esophagitis         Plan:       1) Chest pain syndrome. The patient reports a chest pain syndrome that has some feature consistent with typical angina and some features that are atypical. She is clear that her chest pain syndrome resolved after she underwent LM and proximal LAD PCI in May 2017 and that her symptoms recurred on May 1st, 2018.  Her 5/17/18 coronary angiogram from [facility] demonstrated ISR of the LM and LAD stents (also LIMA to LAD was atretic, but SVG to D1, SVG to OM1, and SVG to PDA were all patent). I explained to the patient that her chest pain syndrome is likely multi-factorial in etiology.  The fact that she experienced relief of chest pain after her May 2017 PCI and  recurrence of her chest pain that temporally corollates with the development of ISR of her LM and proximal LAD stents makes I likely that obstructive CAD may be at least partially responsible for her chest pain syndrome. I therefore discussed PCi with possibel brachytherapy for the treatment of her LM and proximal LAD ISR.  After discussing the potential benefit and risks, the patient stated she would like to proceed with cardiac catheterization and possible PCI. Will also check TSH.  6Fr R CFA access  The risks, benefits, and alternatives of cardiac catheterization and possible intervention were discussed with the patient.  All questions were answered and informed consent was obtained.  Will refer the patient to radiation oncology for brachytherapy consultation  -continue aspirin-omeprazole 81-40 mg po qday  -continue ticagrelor 90mg po BID  -continue Toprol 50mg po qday  -continue Ranexa 500mg po BID  -continue Imdur 60mg po qday    2) DM2. rec tight glycemic control; I explained to the patient that DM2 is a risk factor for the development of ISR    3) HTN. The patient's blood pressure was adequately controlled in clinic today; continue current medications    4) Dyslipidemia. Continue statin    5) CKD3. Creatinine today is 1.0, but eGFR is 58.4; as patient underwent angiography on 5/21/18, will check BMP on day of angiogram; oral hydration the day prior to cath; IV hydration the day of cath    6) H/o GERD. Continue PPI    All of the patient's and her granddaughter's questions were answered.

## 2018-05-25 NOTE — PLAN OF CARE
Received report from SERGE Deshpande. Patient s/p [facility], AAOx3. VSS, no c/o pain or discomfort at this time, resp even and unlabored. Gauze/tegaderm dressing to R groin is CDI. No active bleeding. No hematoma noted. Post procedure protocol reviewed with patient and patient's family. Understanding verbalized. Family members at bedside. Nurse call bell within reach. Will continue to monitor per post procedure protocol.

## 2018-05-25 NOTE — INTERVAL H&P NOTE
The patient has been examined and the H&P has been reviewed:    I concur with the findings and no changes have occurred since H&P was written.  Patient here for LM/LAD PCI with brachytherapy for ISR.  R CFA Access, 6Fr.  NPO since yesterday.  Took ASA and Brilinta this AM.  Also took her Janumet this AM.  Consents in chart - signed in clinic.    Anesthesia/Surgery risks, benefits and alternative options discussed and understood by patient/family.    Active Hospital Problems    Diagnosis  POA    Coronary artery disease of native artery of native heart with stable angina pectoris [I25.118]  Yes      Resolved Hospital Problems    Diagnosis Date Resolved POA   No resolved problems to display.

## 2018-05-25 NOTE — DISCHARGE SUMMARY
Discharge Summary  Interventional Cardiology      Admit Date: 5/25/2018    Discharge Date:  5/26/2018    Attending Physician: Samuel Robertson MD    Discharge Physician: Jag Garcia MD    Principal Diagnoses: Coronary artery disease of native artery of native heart with stable angina pectoris  Indication for Admission: [id number]-CORONARY (PTCA) (N/A), BRACHYTHERAPY (N/A)    Discharged Condition: Good    Hospital Course:   Patient presented for outpatient [facility] which went without complication. Patient underwent successful POBA and brachytherapy to LM and LAD. See full cath report in EPIC for details. Hemostasis of her R CFA access site was achieved with a Mynx device. Patient was monitored overnight. This morning, her groin access site was c/d/i, no hematoma, and she was able to ambulate without difficulty. She was feeling well this morning and anticipating discharge home today.    Outpatient Plan:  - Continue medical management  - Risk factor reduction  - Ticagrelor for at least 1 year and ASA 81 mg indefinitely  - Follow-up with outpatient cardiologist (Dr. Vidal)  - There were no medication changes    Diet: Cardiac diet    Activity: Ad wilder, wound care instructions provided    Disposition: Home or Self Care    Discharge Medications:      Medication List      CONTINUE taking these medications    alendronate 70 MG tablet  Commonly known as:  FOSAMAX     ALPRAZolam 0.25 MG tablet  Commonly known as:  XANAX     atorvastatin 40 MG tablet  Commonly known as:  LIPITOR     BRILINTA 90 mg tablet  Generic drug:  ticagrelor     calcium carbonate-vit D3-min 600 mg calcium- 400 unit Tab     cetirizine 10 MG tablet  Commonly known as:  ZYRTEC     * ferrous gluconate 236 mg (27 mg iron) Tab     * ferrous gluconate 324 MG tablet  Commonly known as:  FERGON     FLUoxetine 40 MG capsule  Commonly known as:  PROZAC     gabapentin 300 MG capsule  Commonly known as:  NEURONTIN     * isosorbide  mononitrate 30 MG 24 hr tablet  Commonly known as:  IMDUR     * isosorbide mononitrate 60 MG 24 hr tablet  Commonly known as:  IMDUR     JANUMET XR  mg Tm24  Generic drug:  SITagliptan-metformin     lisinopril-hydrochlorothiazide 10-12.5 mg per tablet  Commonly known as:  PRINZIDE,ZESTORETIC     metoprolol tartrate 25 MG tablet  Commonly known as:  LOPRESSOR     nitroGLYCERIN 0.4 MG SL tablet  Commonly known as:  NITROSTAT     RANEXA 500 MG Tb12  Generic drug:  ranolazine     TOPROL XL 50 MG 24 hr tablet  Generic drug:  metoprolol succinate     traMADol 50 mg tablet  Commonly known as:  ULTRAM     VENTOLIN HFA 90 mcg/actuation inhaler  Generic drug:  albuterol     YOSPRALA 81-40 mg Tbid  Generic drug:  aspirin-omeprazole        * This list has 4 medication(s) that are the same as other medications prescribed for you. Read the directions carefully, and ask your doctor or other care provider to review them with you.              Follow Up:  Follow-up Information     Lyric Vidal MD.    Specialty:  Cardiology  Contact information:  [id number] [place] [id number] [phone]

## 2018-05-26 VITALS
TEMPERATURE: 98 F | WEIGHT: 185 LBS | OXYGEN SATURATION: 100 % | SYSTOLIC BLOOD PRESSURE: 152 MMHG | BODY MASS INDEX: 34.04 KG/M2 | HEART RATE: 62 BPM | RESPIRATION RATE: 18 BRPM | DIASTOLIC BLOOD PRESSURE: 70 MMHG | HEIGHT: 62 IN

## 2018-05-26 PROCEDURE — 25000003 PHARM REV CODE 250: Performed by: INTERNAL MEDICINE

## 2018-05-26 RX ADMIN — ATORVASTATIN CALCIUM 40 MG: 20 TABLET, FILM COATED ORAL at 08:05

## 2018-05-26 RX ADMIN — ISOSORBIDE MONONITRATE 60 MG: 60 TABLET, EXTENDED RELEASE ORAL at 08:05

## 2018-05-26 RX ADMIN — METOPROLOL SUCCINATE 50 MG: 50 TABLET, EXTENDED RELEASE ORAL at 08:05

## 2018-05-26 RX ADMIN — LISINOPRIL AND HYDROCHLOROTHIAZIDE 1 TABLET: 12.5; 1 TABLET ORAL at 08:05

## 2018-05-26 RX ADMIN — TICAGRELOR 90 MG: 90 TABLET ORAL at 08:05

## 2018-05-26 RX ADMIN — ASPIRIN 81 MG: 81 TABLET, COATED ORAL at 08:05

## 2018-05-26 RX ADMIN — PANTOPRAZOLE SODIUM 40 MG: 40 TABLET, DELAYED RELEASE ORAL at 08:05

## 2018-05-26 NOTE — PROGRESS NOTES
Patient discharged per MD orders. Instructions given on medications, wound care, activity, signs of infection, when to call MD, and follow up appointments. Pt verbalized understanding.  Patient AAOx3, VSS, no c/o pain or discomfort at this time. Telemetry and PIV removed. Patient left unit via wheelchair with transport and family.

## 2018-05-26 NOTE — PLAN OF CARE
Problem: Patient Care Overview  Goal: Plan of Care Review  Outcome: Ongoing (interventions implemented as appropriate)  R groin remains CDI, no bleeding or hematoma noted.  Pt ambulated in hallway, tolerated well.  Will cont to monitor.

## 2018-05-26 NOTE — PLAN OF CARE
Problem: Patient Care Overview  Goal: Plan of Care Review  Outcome: Ongoing (interventions implemented as appropriate)  Plan of care discussed with patient. All questions answered. EDGARD tran, soft. No complaints from patient. Plan for dc home this am.

## 2018-05-26 NOTE — NURSING TRANSFER
Nursing Transfer Note      5/26/2018     Transfer To: 317    Transfer via stretcher    Transfer with cardiac monitoring    Transported by nurse    Medicines sent: none    Chart send with patient: Yes    Notified: daughter    Patient reassessed at:1830    Upon arrival to floor: cardiac monitor applied, patient oriented to room, call bell in reach and bed in lowest position

## 2018-05-28 LAB
POC ACTIVATED CLOTTING TIME K: 202 SEC (ref 74–137)
POC ACTIVATED CLOTTING TIME K: 230 SEC (ref 74–137)
POCT GLUCOSE: 73 MG/DL (ref 70–110)
SAMPLE: ABNORMAL
SAMPLE: ABNORMAL

## 2024-02-23 ENCOUNTER — TELEPHONE (OUTPATIENT)
Dept: CARDIOLOGY | Facility: CLINIC | Age: 74
End: 2024-02-23
Payer: MEDICARE

## 2024-02-23 NOTE — TELEPHONE ENCOUNTER
Faxed request for [facility] medical records for patient recent studies. ECHO, angiogram, admission.

## 2024-02-23 NOTE — TELEPHONE ENCOUNTER
Contact with patient.  Assisted with appointment, pt states last seen 2018 with Dr Robertson and was under the care of Dr Vidal in [place].  Pt states she was told Dr Vidal unable to assist with other procedures as they are unable at [facility].  Pt with hx CABG oct 2013.  Previous stents 3 years ago.  The past 6 months with admissions related to angina pain.  Contact with Dr Vidal office [phone] to speak with staff to request imaging, testing available for MD review such as ECHO, Angiogram.  Awaiting office response.

## 2024-03-13 ENCOUNTER — TELEPHONE (OUTPATIENT)
Dept: CARDIOLOGY | Facility: CLINIC | Age: 74
End: 2024-03-13
Payer: MEDICARE

## 2024-03-13 NOTE — TELEPHONE ENCOUNTER
Request for assist for imaging [facility].  Pt with clinical reports of testing however No imaging received from [place].  Staff to address and call office back.

## 2024-03-14 ENCOUNTER — TELEPHONE (OUTPATIENT)
Dept: CARDIOLOGY | Facility: CLINIC | Age: 74
End: 2024-03-14
Payer: MEDICARE

## 2024-03-14 NOTE — TELEPHONE ENCOUNTER
Called patient back.  Pt reports she was able to request her cath disc. And will  and bring with her appointment.

## 2024-03-14 NOTE — TELEPHONE ENCOUNTER
Contact with patient after multiple request from [facility] for radiology cath lab disc.  Pt had C in 2024 and 2023 and disc requested has not been received only paper reports.  Notification and request to patient to attempt to obtain and bring for appointment on 3/21/24.  Pt voiced understanding and will attempt to get copy as well.  Pt is referral from Dr Vidal, contact with office staff to request assistance as well. Awaiting call back.

## 2024-03-21 ENCOUNTER — OFFICE VISIT (OUTPATIENT)
Dept: CARDIOLOGY | Facility: CLINIC | Age: 74
End: 2024-03-21
Payer: MEDICARE

## 2024-03-21 ENCOUNTER — EDUCATION (OUTPATIENT)
Dept: CARDIOLOGY | Facility: CLINIC | Age: 74
End: 2024-03-21
Payer: MEDICARE

## 2024-03-21 VITALS
OXYGEN SATURATION: 98 % | HEART RATE: 75 BPM | DIASTOLIC BLOOD PRESSURE: 74 MMHG | HEIGHT: 61 IN | SYSTOLIC BLOOD PRESSURE: 162 MMHG | BODY MASS INDEX: 33.96 KG/M2 | WEIGHT: 179.88 LBS

## 2024-03-21 DIAGNOSIS — E11.9 TYPE 2 DIABETES MELLITUS WITHOUT COMPLICATION, WITHOUT LONG-TERM CURRENT USE OF INSULIN: ICD-10-CM

## 2024-03-21 DIAGNOSIS — E78.5 DYSLIPIDEMIA: ICD-10-CM

## 2024-03-21 DIAGNOSIS — I27.20 PULMONARY HYPERTENSION: ICD-10-CM

## 2024-03-21 DIAGNOSIS — I48.3 TYPICAL ATRIAL FLUTTER: Primary | ICD-10-CM

## 2024-03-21 DIAGNOSIS — I25.118 CORONARY ARTERY DISEASE OF NATIVE ARTERY OF NATIVE HEART WITH STABLE ANGINA PECTORIS: ICD-10-CM

## 2024-03-21 DIAGNOSIS — I10 ESSENTIAL HYPERTENSION: ICD-10-CM

## 2024-03-21 PROCEDURE — 1159F MED LIST DOCD IN RCRD: CPT | Mod: CPTII,S$GLB,, | Performed by: INTERNAL MEDICINE

## 2024-03-21 PROCEDURE — 99204 OFFICE O/P NEW MOD 45 MIN: CPT | Mod: S$GLB,,, | Performed by: INTERNAL MEDICINE

## 2024-03-21 PROCEDURE — 3288F FALL RISK ASSESSMENT DOCD: CPT | Mod: CPTII,S$GLB,, | Performed by: INTERNAL MEDICINE

## 2024-03-21 PROCEDURE — 1126F AMNT PAIN NOTED NONE PRSNT: CPT | Mod: CPTII,S$GLB,, | Performed by: INTERNAL MEDICINE

## 2024-03-21 PROCEDURE — 3008F BODY MASS INDEX DOCD: CPT | Mod: CPTII,S$GLB,, | Performed by: INTERNAL MEDICINE

## 2024-03-21 PROCEDURE — 3077F SYST BP >= 140 MM HG: CPT | Mod: CPTII,S$GLB,, | Performed by: INTERNAL MEDICINE

## 2024-03-21 PROCEDURE — 99999 PR PBB SHADOW E&M-EST. PATIENT-LVL V: CPT | Mod: PBBFAC,,, | Performed by: INTERNAL MEDICINE

## 2024-03-21 PROCEDURE — 1101F PT FALLS ASSESS-DOCD LE1/YR: CPT | Mod: CPTII,S$GLB,, | Performed by: INTERNAL MEDICINE

## 2024-03-21 PROCEDURE — 1160F RVW MEDS BY RX/DR IN RCRD: CPT | Mod: CPTII,S$GLB,, | Performed by: INTERNAL MEDICINE

## 2024-03-21 PROCEDURE — 3078F DIAST BP <80 MM HG: CPT | Mod: CPTII,S$GLB,, | Performed by: INTERNAL MEDICINE

## 2024-03-21 RX ORDER — AMLODIPINE BESYLATE 5 MG/1
5 TABLET ORAL DAILY
COMMUNITY

## 2024-03-21 RX ORDER — TIRZEPATIDE 2.5 MG/.5ML
INJECTION, SOLUTION SUBCUTANEOUS
COMMUNITY

## 2024-03-21 RX ORDER — MONTELUKAST SODIUM 10 MG/1
TABLET ORAL NIGHTLY
COMMUNITY

## 2024-03-21 RX ORDER — DORZOLAMIDE HCL 20 MG/ML
1 SOLUTION/ DROPS OPHTHALMIC 3 TIMES DAILY
COMMUNITY

## 2024-03-21 RX ORDER — HYDROGEN PEROXIDE 3 %
20 SOLUTION, NON-ORAL MISCELLANEOUS
COMMUNITY

## 2024-03-21 RX ORDER — TRAZODONE HYDROCHLORIDE 100 MG/1
100 TABLET ORAL NIGHTLY
COMMUNITY

## 2024-03-21 RX ORDER — NITROGLYCERIN 0.4 MG/1
0.4 TABLET SUBLINGUAL EVERY 5 MIN PRN
COMMUNITY

## 2024-03-21 RX ORDER — CLOPIDOGREL BISULFATE 75 MG/1
75 TABLET ORAL DAILY
COMMUNITY

## 2024-03-21 RX ORDER — DICYCLOMINE HYDROCHLORIDE 20 MG/1
20 TABLET ORAL EVERY 6 HOURS
COMMUNITY

## 2024-03-21 RX ORDER — CYCLOSPORINE 0.5 MG/ML
1 EMULSION OPHTHALMIC 2 TIMES DAILY
COMMUNITY

## 2024-03-21 RX ORDER — EZETIMIBE 10 MG/1
10 TABLET ORAL DAILY
COMMUNITY

## 2024-03-21 RX ORDER — ONDANSETRON 4 MG/1
4 TABLET, ORALLY DISINTEGRATING ORAL ONCE
COMMUNITY

## 2024-03-21 NOTE — PROGRESS NOTES
If you need to change the date of your procedure, please call  Rosalinda MCNAMARA [phone]  CALL THE OFFICE IF YOU HAVE ANY MEDICATIONS CHANGES BEFORE A PROCEDURE.     OUTPATIENT CATHETERIZATION INSTRUCTIONS -  rescheduled     You have been scheduled for a procedure in the catheterization lab on WEDNESDAY, MAY 1, 2024     Please report to the Cardiology Waiting Area on the Third floor of the hospital and check in at ARRIVAL TIME at 700AM.   You will then be taken to the SSCU (Short Stay Cardiac Unit) and prepared for your procedure. Please be aware that this is not the time of your procedure but the time you are to arrive. The procedures are scheduled on an hourly basis; however, emergency cases take precedence over all other cases.         1. NOTHING TO EAT AFTER MIDNIGHT 12AM the morning of the procedure.  You may take your medications with small sips of water the morning of the procedure except those medicines stopped. NO DIABETIC MEDS. SEE BELOW INSTRUCTIONS ON MEDICATIONS.     2.   Do not stop your Aspirin, Plavix, Effient, or Brilinta.  SEE BELOW.    3.  Diabetics: IMPORTANT  HOLD THE FOLLOWING MEDICATIONS:  STOP MOUNJARO ONE WEEK BEFORE THE PROCEDURE.    IF TAKING OZEMPIC- THIS MUST BE HELD FOR ONE WEEK PRIOR TO PROCEDURE.    4. Heart Failure Patients:N/A        The procedure will take 1-2 hours to perform. After the procedure, you will return to SSCU on the third floor of the hospital. You will need to lie still (or keep your arm still) for the next 4 to 6 hours to minimize bleeding from the puncture site. Your family may remain in the room with you during this time.         You may be able to be discharged home that same afternoon if there is someone to drive you home and there were no complications. If you have one of the balloon, stent, or device procedures you may spend the night in the hospital. Your doctor will determine, based on your progress, the date and time of your discharge. The results of your  procedure will be discussed with you before you are discharged. Any further testing or procedures will be scheduled for you either before you leave or you will be called with these appointments.   YOU WILL NOT BE ABLE TO DRIVE HOME  THE DAY OF THE PROCEDURE.      If you should have any questions, concerns, or please call Rosalinda [phone]        Special Instructions:  Drink plenty of water the day before your procedure and the day after the procedure to flush your kidneys.     Continue daily medications, including aspirin the morning of the procedure.  See Special instructions for other medications.     IMPORTANT:  HOLD The following medications as directed:    HOLD FLUID or DIURETIC PILLS if you take any, Do Not Take the morning of the procedure.      DO NOT STOP ASPIRIN OR PLAVIX.  Take the morning of the procedure.             THE ABOVE INSTRUCTIONS WERE GIVEN TO THE PATIENT VERBALLY AND THEY VERBALIZED UNDERSTANDING.  THEY DO NOT REQUIRE ANY SPECIAL NEEDS AND DO NOT HAVE ANY LEARNING BARRIERS.          Directions for Reporting to Cardiology Waiting Area in the Hospital  If you park in the Parking Garage:  Take elevators to the1st floor of the parking garage.  Continue past the gift shop, coffee shop, and piano.  Take a right and go to the gold elevators. (Elevator B)  Take the elevator to the 3rd floor.  Follow the arrow on the sign on the wall that says Cath Lab Registration/EP Lab Registration.  Follow the long hallway all the way around until you come to a big open area.  This is the registration area.  Check in at Reception Desk.     OR     If family is dropping you off:  Have them drop you off at the front of the Hospital under the green overhang.  Enter through the doors and take a right.  Take the 'E' elevators to the 3rd floor Cardiology Waiting Area.  Check in at the Reception Desk in the waiting room.

## 2024-03-21 NOTE — PROGRESS NOTES
Subjective:    Patient ID:  Maureen Limon is a 73 y.o. female who presents for follow-up of Coronary Artery Disease      Referring physician: Dr. Lyric Vidal     HPI  72 yo female with PMH CAD s/p CABG, HTN, HLD, DM presents to the clinic to discuss about her chest pain.  Patient states that she has been experiencing this chest pains for more than a year which mostly happens early in the morning  which is associated shortness a breath.  She describes her chest pain as pressure sensation that radiates to her left shoulder.  She also describes having shortness of breath at rest and also on exertion with orthopnea, bilateral lower extremity edema occasionally , bloating of her abdomen.  As per the daughter patient snores and has apneic spells during sleep and  she was never worked up for sleep apnea. On reviewing her echo she does have moderate to severe RV dysfunction with elevated PA pressures.  She underwent brachytherapy for left main stent in 2018.  Given ongoing chest pain she had angiogram twice  in the month of November 2023 and once in January this year at [facility] .  On reviewing those  angiograms she has patent left main stent with patent LIMA and Grafts to OM, RCA.    Past Medical History:   Diagnosis Date    Atrial flutter     Coronary artery disease     Diabetes mellitus     GERD (gastroesophageal reflux disease)     Hyperlipidemia     Hypertension     Pneumonia        Past Surgical History:   Procedure Laterality Date    CARDIAC SURGERY      CORONARY ARTERY BYPASS GRAFT         Current Outpatient Medications on File Prior to Visit   Medication Sig Dispense Refill    amLODIPine (NORVASC) 5 MG tablet Take 5 mg by mouth once daily.      aspirin-omeprazole (YOSPRALA) 81-40 mg TbID Yosprala 81 mg-40 mg tablet,immediate and delay release   Take 1 tablet every day by oral route.      atorvastatin (LIPITOR) 40 MG tablet atorvastatin 40 mg tablet   1 po qd      blood sugar diagnostic (BLOOD  GLUCOSE TEST) Strp by Misc.(Non-Drug; Combo Route) route.      clopidogreL (PLAVIX) 75 mg tablet Take 75 mg by mouth once daily.      cycloSPORINE (RESTASIS) 0.05 % ophthalmic emulsion 1 drop 2 (two) times daily.      dicyclomine (BENTYL) 20 mg tablet Take 20 mg by mouth every 6 (six) hours.      dorzolamide (TRUSOPT) 2 % ophthalmic solution 1 drop 3 (three) times daily.      esomeprazole (NEXIUM) 20 MG capsule Take 20 mg by mouth before breakfast.      ezetimibe (ZETIA) 10 mg tablet Take 10 mg by mouth once daily.      gabapentin (NEURONTIN) 300 MG capsule Take 300 mg by mouth 3 (three) times daily as needed.       isosorbide mononitrate (IMDUR) 30 MG 24 hr tablet Take 60 mg by mouth once daily.       metoprolol succinate (TOPROL XL) 50 MG 24 hr tablet Toprol XL 50 mg tablet,extended release   Take 1 tablet every day by oral route.      metoprolol tartrate (LOPRESSOR) 25 MG tablet Take 50 mg by mouth once daily. HALF       nitroGLYCERIN (NITROSTAT) 0.4 MG SL tablet Place 0.4 mg under the tongue every 5 (five) minutes as needed for Chest pain.      ranolazine (RANEXA) 500 MG Tb12 Ranexa 500 mg tablet,extended release   Take 1 tablet twice a day by oral route.      tirzepatide (MOUNJARO) 2.5 mg/0.5 mL PnIj Inject into the skin every 7 days.      traZODone (DESYREL) 100 MG tablet Take 100 mg by mouth every evening.      albuterol (VENTOLIN HFA) 90 mcg/actuation inhaler Ventolin HFA 90 mcg/actuation aerosol inhaler      alendronate (FOSAMAX) 70 MG tablet alendronate 70 mg tablet      ALPRAZolam (XANAX) 0.25 MG tablet alprazolam 0.25 mg tablet      calcium carbonate-vit D3-min 600 mg calcium- 400 unit Tab Take by mouth.      cetirizine (ZYRTEC) 10 MG tablet Take 10 mg by mouth.      ferrous gluconate (FERGON) 324 MG tablet Take 324 mg by mouth daily with breakfast.      ferrous gluconate 236 mg (27 mg iron) Tab ferrous gluconate   324mg 1 po qd      FLUoxetine (PROZAC) 40 MG capsule fluoxetine 40 mg capsule       isosorbide mononitrate (IMDUR) 60 MG 24 hr tablet isosorbide mononitrate ER 60 mg tablet,extended release 24 hr   Take 1 tablet every day by oral route.      lisinopril-hydrochlorothiazide (PRINZIDE,ZESTORETIC) 10-12.5 mg per tablet lisinopril 10 mg-hydrochlorothiazide 12.5 mg tablet      montelukast (SINGULAIR) 10 mg tablet Take by mouth every evening.      nitroGLYCERIN (NITROSTAT) 0.4 MG SL tablet nitroglycerin 0.4 mg sublingual tablet   Place 1 tablet as needed by sublingual route.      ondansetron (ZOFRAN-ODT) 4 MG TbDL Take 4 mg by mouth once.      SITagliptan-metformin (JANUMET XR)  mg TM24 Janumet XR 50 mg-500 mg tablet,extended release      ticagrelor (BRILINTA) 90 mg tablet Brilinta 90 mg tablet   TAKE ONE TABLET BY MOUTH TWICE DAILY      traMADol (ULTRAM) 50 mg tablet tramadol 50 mg tablet       Current Facility-Administered Medications on File Prior to Visit   Medication Dose Route Frequency Provider Last Rate Last Admin    oxycodone-acetaminophen 5-325 mg per tablet 1 tablet  1 tablet Oral Q4H PRN Yusuf Cabrera MD           Review of patient's allergies indicates:  No Known Allergies    Social History     Tobacco Use    Smoking status: Never    Smokeless tobacco: Never   Substance Use Topics    Alcohol use: Yes     Comment: occasional       Family History   Problem Relation Age of Onset    Heart disease Mother     Cancer Father     No Known Problems Sister     No Known Problems Brother     No Known Problems Maternal Aunt     No Known Problems Maternal Uncle     No Known Problems Paternal Aunt     No Known Problems Paternal Uncle     No Known Problems Maternal Grandmother     No Known Problems Maternal Grandfather     No Known Problems Paternal Grandmother     No Known Problems Paternal Grandfather     No Known Problems Sister     No Known Problems Brother     Anemia Neg Hx     Arrhythmia Neg Hx     Asthma Neg Hx     Clotting disorder Neg Hx     Fainting Neg Hx     Heart attack Neg Hx      "Heart failure Neg Hx     Hyperlipidemia Neg Hx     Hypertension Neg Hx     Stroke Neg Hx     Atrial Septal Defect Neg Hx          Review of Systems   Constitutional: Negative.   HENT: Negative.     Eyes: Negative.    Cardiovascular:  Positive for chest pain and dyspnea on exertion.   Respiratory:  Positive for shortness of breath.    Endocrine: Negative.    Hematologic/Lymphatic: Negative.    Musculoskeletal: Negative.    Gastrointestinal:  Positive for bloating.   Genitourinary: Negative.    Neurological: Negative.         Objective:     Vitals:    03/21/24 1056 03/21/24 1057   BP: (!) 149/68 (!) 162/74   BP Location: Left arm Right arm   Patient Position: Sitting Sitting   BP Method: Large (Automatic) Large (Automatic)   Pulse: 78 75   SpO2: 98% 98%   Weight: 81.6 kg (179 lb 14.3 oz)    Height: 5' 1" (1.549 m)         Physical Exam  Constitutional:       Appearance: Normal appearance.   HENT:      Head: Normocephalic and atraumatic.      Nose: Nose normal.   Eyes:      Pupils: Pupils are equal, round, and reactive to light.   Cardiovascular:      Rate and Rhythm: Normal rate and regular rhythm.      Pulses: Normal pulses.      Heart sounds: Normal heart sounds.   Pulmonary:      Effort: Pulmonary effort is normal.      Breath sounds: Normal breath sounds.   Abdominal:      General: Abdomen is flat. Bowel sounds are normal.      Palpations: Abdomen is soft.   Musculoskeletal:      Cervical back: Normal range of motion.   Skin:     General: Skin is warm.      Capillary Refill: Capillary refill takes less than 2 seconds.   Neurological:      General: No focal deficit present.      Mental Status: She is alert and oriented to person, place, and time.           Assessment:           1 Coronary artery disease of native artery of native heart with stable angina pectoris    2 Type 2 diabetes mellitus without complication, without long-term current use of insulin    3 Dyslipidemia    4 Essential hypertension    5. Pulmonary " hypertension         Plan:             Pulmonary hypertension   Patient had a recent echo at [facility] that showed moderate-to-severe RV dysfunction with PA systolic pressures up to 60  Will schedule patient for right heart catheterization to check her PA pressures and if elevated we will refer her to Pulmonary hypertension Clinic    - Access: Right cephalic wean  - Catheters: [place]  - Creatinine/CrCl: 1.19  - Allergies: No shellfish / Iodine allergy  - Pre-Hydration: NS  - Pre-Op Med: Bendaryl 50mg pO   - All patient's questions were answered.  -The risks, benefits and alternatives of the procedure were explained to the patient.   -The risks of moderate sedation include hypotension, respiratory depression, arrhythmias, bronchospasm, and death.   - Informed consent was obtained and the  patient is agreeable to proceed with the procedure.      Coronary artery disease status post CABG  Patient has patent grafts to OM, RCA and patent LIMA.  Also her left main stent is patent.  Continue aspirin, Plavix, metoprolol, amlodipine, Lipitor, Imdur 60 mg daily      Hyperlipidemia   Patient's last LDL is 78.  continue Lipitor, ezetimibe      Hypertension blood pressure is elevated in the clinic.  Patient is currently on amlodipine 10 mg, Toprol-XL, isosorbide mononitrate 60 mg, lisinopril- hydrochlorothiazide.  Continue to follow up with primary care physician    Harpreet Aparicio MD  Cardiology Fellow    I have personally taken the history and examined this patient and agree with the resident's note as stated above.  I reviewed the patient's coronary angiogram from the referring hospital as well her her TTE.  Plan as above.  All of the patient's questions were answered.

## 2024-03-22 DIAGNOSIS — I25.118 ATHEROSCLEROSIS OF NATIVE CORONARY ARTERY OF NATIVE HEART WITH STABLE ANGINA PECTORIS: ICD-10-CM

## 2024-03-22 DIAGNOSIS — I25.118 CORONARY ARTERY DISEASE OF NATIVE ARTERY OF NATIVE HEART WITH STABLE ANGINA PECTORIS: Primary | ICD-10-CM

## 2024-03-22 RX ORDER — SODIUM CHLORIDE 9 MG/ML
INJECTION, SOLUTION INTRAVENOUS CONTINUOUS
Status: CANCELLED | OUTPATIENT
Start: 2024-03-22 | End: 2024-03-22

## 2024-03-22 RX ORDER — DIPHENHYDRAMINE HCL 50 MG
50 CAPSULE ORAL ONCE
Status: CANCELLED | OUTPATIENT
Start: 2024-03-22 | End: 2024-03-22

## 2024-04-02 ENCOUNTER — TELEPHONE (OUTPATIENT)
Dept: CARDIOLOGY | Facility: CLINIC | Age: 74
End: 2024-04-02
Payer: MEDICARE

## 2024-04-02 NOTE — TELEPHONE ENCOUNTER
Patient contact,  pt reports she is currently ill and under antibiotic treatment for her infection.  Pt request to reschedule her angiogram, right heart cath.  Assisted to reschedule, new educational information and appointment sent to patient.  New procedure date 5/1/24.

## 2024-04-18 ENCOUNTER — TELEPHONE (OUTPATIENT)
Dept: CARDIOLOGY | Facility: CLINIC | Age: 74
End: 2024-04-18
Payer: MEDICARE

## 2024-04-18 NOTE — TELEPHONE ENCOUNTER
Attempt to reach patient, no answer, LVM.  Arrival time for procedure date 5/1/24 is now changed to 0700AM

## 2024-05-01 ENCOUNTER — HOSPITAL ENCOUNTER (OUTPATIENT)
Facility: HOSPITAL | Age: 74
Discharge: HOME | End: 2024-05-01
Attending: INTERNAL MEDICINE | Admitting: INTERNAL MEDICINE
Payer: MEDICARE

## 2024-05-01 ENCOUNTER — TELEPHONE (OUTPATIENT)
Dept: CARDIOLOGY | Facility: CLINIC | Age: 74
End: 2024-05-01
Payer: MEDICARE

## 2024-05-01 VITALS
HEART RATE: 81 BPM | DIASTOLIC BLOOD PRESSURE: 59 MMHG | WEIGHT: 170 LBS | SYSTOLIC BLOOD PRESSURE: 105 MMHG | RESPIRATION RATE: 18 BRPM | TEMPERATURE: 98 F | HEIGHT: 61 IN | BODY MASS INDEX: 32.1 KG/M2 | OXYGEN SATURATION: 94 %

## 2024-05-01 DIAGNOSIS — I27.20 PULMONARY HYPERTENSION: Primary | ICD-10-CM

## 2024-05-01 DIAGNOSIS — I25.118 ATHEROSCLEROSIS OF NATIVE CORONARY ARTERY OF NATIVE HEART WITH STABLE ANGINA PECTORIS: ICD-10-CM

## 2024-05-01 DIAGNOSIS — Z01.818 PREOPERATIVE TESTING: ICD-10-CM

## 2024-05-01 DIAGNOSIS — I25.118 CORONARY ARTERY DISEASE OF NATIVE ARTERY OF NATIVE HEART WITH STABLE ANGINA PECTORIS: ICD-10-CM

## 2024-05-01 DIAGNOSIS — I25.118 CORONARY ARTERY DISEASE OF NATIVE ARTERY OF NATIVE HEART WITH STABLE ANGINA PECTORIS: Primary | ICD-10-CM

## 2024-05-01 DIAGNOSIS — I50.30 HEART FAILURE WITH PRESERVED EJECTION FRACTION, UNSPECIFIED HF CHRONICITY: ICD-10-CM

## 2024-05-01 LAB
ABO + RH BLD: NORMAL
ANION GAP SERPL CALC-SCNC: 11 MMOL/L (ref 8–16)
BLD GP AB SCN CELLS X3 SERPL QL: NORMAL
BUN SERPL-MCNC: 14 MG/DL (ref 8–23)
CALCIUM SERPL-MCNC: 9.7 MG/DL (ref 8.7–10.5)
CHLORIDE SERPL-SCNC: 103 MMOL/L (ref 95–110)
CO2 SERPL-SCNC: 25 MMOL/L (ref 23–29)
CREAT SERPL-MCNC: 0.8 MG/DL (ref 0.5–1.4)
ERYTHROCYTE [DISTWIDTH] IN BLOOD BY AUTOMATED COUNT: 12.4 % (ref 11.5–14.5)
EST. GFR  (NO RACE VARIABLE): >60 ML/MIN/1.73 M^2
GLUCOSE SERPL-MCNC: 180 MG/DL (ref 70–110)
HCT VFR BLD AUTO: 36.2 % (ref 37–48.5)
HGB BLD-MCNC: 12.3 G/DL (ref 12–16)
MCH RBC QN AUTO: 30.4 PG (ref 27–31)
MCHC RBC AUTO-ENTMCNC: 34 G/DL (ref 32–36)
MCV RBC AUTO: 89 FL (ref 82–98)
OHS QRS DURATION: 82 MS
OHS QTC CALCULATION: 476 MS
PLATELET # BLD AUTO: 292 K/UL (ref 150–450)
PMV BLD AUTO: 9.9 FL (ref 9.2–12.9)
POCT GLUCOSE: 165 MG/DL (ref 70–110)
POTASSIUM SERPL-SCNC: 3.9 MMOL/L (ref 3.5–5.1)
RBC # BLD AUTO: 4.05 M/UL (ref 4–5.4)
SODIUM SERPL-SCNC: 139 MMOL/L (ref 136–145)
SPECIMEN OUTDATE: NORMAL
WBC # BLD AUTO: 9.73 K/UL (ref 3.9–12.7)

## 2024-05-01 PROCEDURE — 80048 BASIC METABOLIC PNL TOTAL CA: CPT | Performed by: INTERNAL MEDICINE

## 2024-05-01 PROCEDURE — 93010 ELECTROCARDIOGRAM REPORT: CPT | Mod: ,,, | Performed by: INTERNAL MEDICINE

## 2024-05-01 PROCEDURE — C1894 INTRO/SHEATH, NON-LASER: HCPCS | Performed by: INTERNAL MEDICINE

## 2024-05-01 PROCEDURE — 99152 MOD SED SAME PHYS/QHP 5/>YRS: CPT | Mod: ,,, | Performed by: INTERNAL MEDICINE

## 2024-05-01 PROCEDURE — 93451 RIGHT HEART CATH: CPT | Performed by: INTERNAL MEDICINE

## 2024-05-01 PROCEDURE — 25000003 PHARM REV CODE 250: Performed by: INTERNAL MEDICINE

## 2024-05-01 PROCEDURE — 93005 ELECTROCARDIOGRAM TRACING: CPT | Mod: 59

## 2024-05-01 PROCEDURE — 25000003 PHARM REV CODE 250: Performed by: STUDENT IN AN ORGANIZED HEALTH CARE EDUCATION/TRAINING PROGRAM

## 2024-05-01 PROCEDURE — 63600175 PHARM REV CODE 636 W HCPCS: Performed by: INTERNAL MEDICINE

## 2024-05-01 PROCEDURE — C1751 CATH, INF, PER/CENT/MIDLINE: HCPCS | Performed by: INTERNAL MEDICINE

## 2024-05-01 PROCEDURE — 85027 COMPLETE CBC AUTOMATED: CPT | Performed by: INTERNAL MEDICINE

## 2024-05-01 PROCEDURE — 99152 MOD SED SAME PHYS/QHP 5/>YRS: CPT | Performed by: INTERNAL MEDICINE

## 2024-05-01 PROCEDURE — 93451 RIGHT HEART CATH: CPT | Mod: 26,,, | Performed by: INTERNAL MEDICINE

## 2024-05-01 PROCEDURE — 86901 BLOOD TYPING SEROLOGIC RH(D): CPT | Performed by: INTERNAL MEDICINE

## 2024-05-01 PROCEDURE — 82962 GLUCOSE BLOOD TEST: CPT | Performed by: INTERNAL MEDICINE

## 2024-05-01 RX ORDER — DIPHENHYDRAMINE HCL 50 MG
50 CAPSULE ORAL ONCE
Status: COMPLETED | OUTPATIENT
Start: 2024-05-01 | End: 2024-05-01

## 2024-05-01 RX ORDER — MIDAZOLAM HYDROCHLORIDE 1 MG/ML
INJECTION, SOLUTION INTRAMUSCULAR; INTRAVENOUS
Status: DISCONTINUED | OUTPATIENT
Start: 2024-05-01 | End: 2024-05-01

## 2024-05-01 RX ORDER — ONDANSETRON 8 MG/1
8 TABLET, ORALLY DISINTEGRATING ORAL EVERY 8 HOURS PRN
Status: DISCONTINUED | OUTPATIENT
Start: 2024-05-01 | End: 2024-05-01 | Stop reason: HOSPADM

## 2024-05-01 RX ORDER — ACETAMINOPHEN 325 MG/1
650 TABLET ORAL EVERY 4 HOURS PRN
Status: DISCONTINUED | OUTPATIENT
Start: 2024-05-01 | End: 2024-05-01 | Stop reason: HOSPADM

## 2024-05-01 RX ORDER — ASPIRIN 81 MG/1
81 TABLET ORAL DAILY
COMMUNITY

## 2024-05-01 RX ORDER — LEVOCETIRIZINE DIHYDROCHLORIDE 5 MG/1
5 TABLET, FILM COATED ORAL NIGHTLY
COMMUNITY

## 2024-05-01 RX ORDER — SODIUM CHLORIDE 9 MG/ML
INJECTION, SOLUTION INTRAVENOUS CONTINUOUS
Status: ACTIVE | OUTPATIENT
Start: 2024-05-01 | End: 2024-05-01

## 2024-05-01 RX ORDER — FENTANYL CITRATE 50 UG/ML
INJECTION, SOLUTION INTRAMUSCULAR; INTRAVENOUS
Status: DISCONTINUED | OUTPATIENT
Start: 2024-05-01 | End: 2024-05-01

## 2024-05-01 RX ORDER — LIDOCAINE HYDROCHLORIDE 20 MG/ML
INJECTION, SOLUTION EPIDURAL; INFILTRATION; INTRACAUDAL; PERINEURAL
Status: DISCONTINUED | OUTPATIENT
Start: 2024-05-01 | End: 2024-05-01

## 2024-05-01 RX ADMIN — DIPHENHYDRAMINE HYDROCHLORIDE 50 MG: 50 CAPSULE ORAL at 07:05

## 2024-05-01 RX ADMIN — ACETAMINOPHEN 650 MG: 325 TABLET ORAL at 11:05

## 2024-05-01 RX ADMIN — SODIUM CHLORIDE: 9 INJECTION, SOLUTION INTRAVENOUS at 07:05

## 2024-05-01 NOTE — NURSING
Pt transferred from cath lab via stretcher.  Vss.  Post op orders and assessment initiated.  Pt aao.  Family called to bs.  Pt in no acute distress and verbalizes no complaints. Call bell within reach.

## 2024-05-01 NOTE — Clinical Note
The PA catheter was repositioned to the main pulmonary artery. Hemodynamics were performed. O2 saturation was measured at 67%. AO: 90  CO: 6.09  CI: 3.46

## 2024-05-01 NOTE — BRIEF OP NOTE
Brief Operative Note:    : Samuel Robertson MD     Referring Physician: Samuel Robertson     All Operators: Surgeon(s):  Samuel Robertson MD Pantlin, Peter G., MD Davis, Amandeep ONEIL MD     Preoperative Diagnosis: Coronary artery disease of native artery of native heart with stable angina pectoris [I25.118]     Postop Diagnosis: HFpEF    Treatments/Procedures: Procedure(s) (LRB):  INSERTION, CATHETER, RIGHT HEART (N/A)    Findings:  -normal Right sided filling pressures   -elevated PW of 33  -normal CO/CI    Estimated Blood loss: 20 cc    Specimens removed: No    Recommendations:   - Routine post-cath care as per orders  - follow up with HF for HFpEF    Amandeep Lynn

## 2024-05-01 NOTE — H&P
Interventional Cardiology H&P Note  Attending Physician: Samuel Robertson MD     HPI:     73 yo female with PMH CAD s/p CABG, HTN, HLD, DM presents to the clinic to discuss about her chest pain.  Patient states that she has been experiencing this chest pains for more than a year which mostly happens early in the morning  which is associated shortness a breath.  She describes her chest pain as pressure sensation that radiates to her left shoulder.  She also describes having shortness of breath at rest and also on exertion with orthopnea, bilateral lower extremity edema occasionally , bloating of her abdomen.  As per the daughter patient snores and has apneic spells during sleep and  she was never worked up for sleep apnea. On reviewing her echo she does have moderate to severe RV dysfunction with elevated PA pressures. She underwent brachytherapy for left main stent in 2018. Given ongoing chest pain she had angiogram twice  in the month of November 2023 and once in January this year at [place]. On reviewing those  angiograms, she has patent left main stent with patent LIMA and Grafts to OM, RCA.     ROS:    Constitution: Negative for fever, chills, weight loss or gain.   HENT: Negative for sore throat, rhinorrhea, or headache.  Eyes: Negative for blurred or double vision.   Cardiovascular: See above  Pulmonary: negative for SOB   Gastrointestinal: Negative for abdominal pain, nausea, vomiting, or diarrhea.   : Negative for dysuria.   Neurological: Negative for focal weakness or sensory changes.  PMH:     Past Medical History:   Diagnosis Date    Atrial flutter     Coronary artery disease     Diabetes mellitus     GERD (gastroesophageal reflux disease)     Hyperlipidemia     Hypertension     Pneumonia      Past Surgical History:   Procedure Laterality Date    CARDIAC SURGERY      CORONARY ARTERY BYPASS GRAFT       Allergies:   Review of patient's allergies indicates:  No Known Allergies  Medications:      No current facility-administered medications on file prior to encounter.     Current Outpatient Medications on File Prior to Encounter   Medication Sig Dispense Refill    amLODIPine (NORVASC) 5 MG tablet Take 5 mg by mouth once daily.      aspirin (ECOTRIN) 81 MG EC tablet Take 81 mg by mouth once daily.      atorvastatin (LIPITOR) 40 MG tablet atorvastatin 40 mg tablet   1 po qd      clopidogreL (PLAVIX) 75 mg tablet Take 75 mg by mouth once daily.      dicyclomine (BENTYL) 20 mg tablet Take 20 mg by mouth every 6 (six) hours.      esomeprazole (NEXIUM) 20 MG capsule Take 20 mg by mouth before breakfast.      ezetimibe (ZETIA) 10 mg tablet Take 10 mg by mouth once daily.      isosorbide mononitrate (IMDUR) 30 MG 24 hr tablet Take 60 mg by mouth once daily.       levocetirizine (XYZAL) 5 MG tablet Take 5 mg by mouth every evening.      metoprolol succinate (TOPROL XL) 50 MG 24 hr tablet Toprol XL 50 mg tablet,extended release   Take 1 tablet every day by oral route.      montelukast (SINGULAIR) 10 mg tablet Take by mouth every evening.      nitroGLYCERIN (NITROSTAT) 0.4 MG SL tablet Place 0.4 mg under the tongue every 5 (five) minutes as needed for Chest pain.      ondansetron (ZOFRAN-ODT) 4 MG TbDL Take 4 mg by mouth once.      traZODone (DESYREL) 100 MG tablet Take 100 mg by mouth every evening.      blood sugar diagnostic (BLOOD GLUCOSE TEST) Strp by Misc.(Non-Drug; Combo Route) route.      cycloSPORINE (RESTASIS) 0.05 % ophthalmic emulsion 1 drop 2 (two) times daily.      dorzolamide (TRUSOPT) 2 % ophthalmic solution 1 drop 3 (three) times daily.      lisinopril-hydrochlorothiazide (PRINZIDE,ZESTORETIC) 10-12.5 mg per tablet lisinopril 10 mg-hydrochlorothiazide 12.5 mg tablet      metoprolol tartrate (LOPRESSOR) 25 MG tablet Take 50 mg by mouth once daily. HALF       ranolazine (RANEXA) 500 MG Tb12 Ranexa 500 mg tablet,extended release   Take 1 tablet twice a day by oral route.      tirzepatide (MOUNJARO)  2.5 mg/0.5 mL PnIj Inject into the skin every 7 days.      [DISCONTINUED] albuterol (VENTOLIN HFA) 90 mcg/actuation inhaler Ventolin HFA 90 mcg/actuation aerosol inhaler      [DISCONTINUED] alendronate (FOSAMAX) 70 MG tablet alendronate 70 mg tablet      [DISCONTINUED] ALPRAZolam (XANAX) 0.25 MG tablet alprazolam 0.25 mg tablet      [DISCONTINUED] aspirin-omeprazole (YOSPRALA) 81-40 mg TbID Yosprala 81 mg-40 mg tablet,immediate and delay release   Take 1 tablet every day by oral route.      [DISCONTINUED] calcium carbonate-vit D3-min 600 mg calcium- 400 unit Tab Take by mouth.      [DISCONTINUED] cetirizine (ZYRTEC) 10 MG tablet Take 10 mg by mouth.      [DISCONTINUED] ferrous gluconate (FERGON) 324 MG tablet Take 324 mg by mouth daily with breakfast.      [DISCONTINUED] ferrous gluconate 236 mg (27 mg iron) Tab ferrous gluconate   324mg 1 po qd      [DISCONTINUED] FLUoxetine (PROZAC) 40 MG capsule fluoxetine 40 mg capsule      [DISCONTINUED] gabapentin (NEURONTIN) 300 MG capsule Take 300 mg by mouth 3 (three) times daily as needed.       [DISCONTINUED] isosorbide mononitrate (IMDUR) 60 MG 24 hr tablet isosorbide mononitrate ER 60 mg tablet,extended release 24 hr   Take 1 tablet every day by oral route.      [DISCONTINUED] nitroGLYCERIN (NITROSTAT) 0.4 MG SL tablet nitroglycerin 0.4 mg sublingual tablet   Place 1 tablet as needed by sublingual route.      [DISCONTINUED] SITagliptan-metformin (JANUMET XR)  mg TM24 Janumet XR 50 mg-500 mg tablet,extended release      [DISCONTINUED] ticagrelor (BRILINTA) 90 mg tablet Brilinta 90 mg tablet   TAKE ONE TABLET BY MOUTH TWICE DAILY      [DISCONTINUED] traMADol (ULTRAM) 50 mg tablet tramadol 50 mg tablet       Social History:     Social History     Tobacco Use    Smoking status: Never    Smokeless tobacco: Never   Substance Use Topics    Alcohol use: Yes     Comment: occasional     Physical Exam:     Vitals:  Temp:  [97.7 °F (36.5 °C)]   Pulse:  [78]   Resp:  [18]  "  BP: (119-124)/(60-62)   SpO2:  [95 %]  I/O's:  No intake or output data in the 24 hours ending 05/01/24 0724   General: A&Ox3, resting comfortably in bed  HEENT: PERRL  Neck: no masses, no JVD  Cardiovascular: Regular rate and rhythm, no murmurs, rubs or gallops  Pulm: CTAB, no wheezes or crackles; no respiratory distress  Abdomen: Soft, non-tender, normal BS, non-distended; no organomegaly  Skin: No rashes or erythema noted, no ecchymosis  Extremities: atraumatic, no cyanosis or edema  Pulses: 2+ and symmetric DP, PT, radial, femoral  Neurological: CNII-XII intact, no focal deficits  Psychiatric: normal mood and affect, thought content normal  Labs:     No results for input(s): "NA", "K", "CL", "CO2", "BUN", "CREATININE", "GLUCOSE", "ANIONGAP" in the last 168 hours.  No results for input(s): "AST", "ALT", "ALKPHOS", "BILITOT", "BILIDIR", "ALBUMIN" in the last 168 hours.  No results for input(s): "TROPONINI", "BNP" in the last 168 hours. No results for input(s): "WBC", "HGB", "HCT", "PLT", "GRAN" in the last 168 hours.  No results for input(s): "PTT", "INR" in the last 168 hours.  Lab Results   Component Value Date    CHOL 186 10/05/2023    HDL 42 10/05/2023    LDLCALC 78 10/05/2023    TRIG 331 (H) 10/05/2023     Lab Results   Component Value Date    HGBA1C 8.2 (H) 06/25/2020        Cardiovascular Imaging:   Echo 10/4/23 at [facility]:  -EF 55-60%    LHC 1/22/24 at [facility]:  FINDINGS:   1. Hemodynamics: Left ventricular pressure is 144/14, aortic pressure   140/65. No pullback gradient.   2. Ventriculography showed normal left ventricular function, ejection   fraction of 65%.   CORONARY ANGIOGRAPHY: Left main is stented. There is moderate-to-severe   stenosis in the left main, estimated at 75%-80%. The circumflex is totally   occluded proximally. The LAD has high-grade disease in its mid portion   estimated at 75%-80%. There is   competitive flow in the distal LAD from the mammary artery, which is   widely " patent. There is also a vein graft to the diagonal that is widely   patent. The right coronary artery has subtotal occlusion in its mid   portion.   GRAFTS:   1. The mammary artery to LAD is widely patent and perfuses the   [id number] LAD.   2. The vein graft to the diagonal is widely patent. The diagonal appears   to be occluded at the ostium.   3. The vein graft to the obtuse marginal branch is patent. It perfuses the   entire circumflex system.   4. The vein graft to the right coronary artery is a large graft and it is   widely patent. It perfuses the posterior descending artery and multiple   posterolateral branches.     CONCLUSIONS:   1. Severe multivessel coronary artery disease.   2. Four patent grafts as outlined above.      Assessment & Plan:     Pulmonary hypertension   Patient had a recent echo at [facility] that showed moderate-to-severe RV dysfunction with PA systolic pressures up to 60  Scheduled patient for right heart catheterization to check her PA pressures and if elevated we will refer her to pulmonary hypertension clinic.     - Access: Right cephalic vein  - Catheters: [place]  - Creatinine/CrCl: 0.87, repeat pending  - Allergies: No shellfish / Iodine allergy  - Pre-Hydration: NS  - Pre-Op Med: Bendaryl 50mg pO   - All patient's questions were answered.  -The risks, benefits and alternatives of the procedure were explained to the patient.   -The risks of moderate sedation include hypotension, respiratory depression, arrhythmias, bronchospasm, and death.   - Informed consent was obtained and the patient is agreeable to proceed with the procedure.    Coronary artery disease status post CABG  Patient has patent grafts to OM, RCA and patent LIMA. Also, her left main stent is patent. Continue aspirin, plavix, metoprolol, amlodipine, Lipitor, Imdur 60 mg daily.     Hyperlipidemia   Patient's last LDL is 78. Continue Lipitor, ezetimibe     Hypertension blood pressure was elevated in the  clinic. Patient is currently on amlodipine 10 mg, Toprol-XL, isosorbide mononitrate 60 mg, lisinopril- hydrochlorothiazide. Follow up with PCP.       Signed:  Paulo Espinosa MD  Cardiovascular Disease PGY-V  [facility]

## 2024-05-01 NOTE — TELEPHONE ENCOUNTER
In basket message to staff provider clinic to notify of NP referral.  Request to assist patient with next available appointment.  Dx Eval HF w/ perserved EF.

## 2024-05-01 NOTE — NURSING
Ambulated around unit with RN.  Tolerated well without  bleed or hematoma.  Discharge instructions and medlist given.  Patient and daughter verbalized understanding.  Waiting for MD to come speak to patient and daughter prior to leaving unit.

## 2024-05-01 NOTE — DISCHARGE INSTRUCTIONS
Instructions     1. Do not strain or lift anything greater than 5 lb for 1 week.   2. Do not drive or operate any dangerous machinery for 24 hours.   3. Keep the dressing on, clean, and dry for 24 hours.   4. After 24 hours, the dressing may be removed and a shower is allowed.   5. Clean the area with mild soap and water and then cover it with a bandage.   6. Once the skin has healed, bathing in a tub or swimming is allowed.   7. Inspect the groin site daily and report to the physician any swelling at the site that   cannot be controlled with manual pressure for 10 minutes, unusual pain at the   access site or affected extremity, unusual swelling at the access site, or signs or   symptoms of infection such as redness, pain, or fever.   Call 911 if you have:   Bleeding from the puncture site that you cannot stop by doing the following:   Relax and lie down right away. Keep your leg flat and apply firm pressure to the site using your fingers and a gauze pad. Keep the pressure on for 20 minutes. Continue this until the bleeding stops. This may take awhile. When bleeding stops, cover the site with a sterile bandage and keep your leg still as much as possible.

## 2024-05-01 NOTE — DISCHARGE SUMMARY
[facility] Cath Lab  Interventional Cardiology  Discharge Summary      Patient Name: Maueren Limon  MRN: [id number]  Admission Date: 5/1/2024  Hospital Length of Stay: 0 days  Discharge Date and Time:  05/01/2024 10:14 AM  Attending Physician: Samuel Robertson MD  Discharging Provider: Amandeep Lynn MD  Primary Care Physician: Roni Peralta MD    HPI:  No notes on file    Procedure(s) (LRB):  INSERTION, CATHETER, RIGHT HEART (N/A)     Indwelling Lines/Drains at time of discharge:  Lines/Drains/Airways       None                   Hospital Course:  Patient brought for planned RHC that was consistent with group 2 PH. She tolerated the procedure well and there were no complications. She was then sent home with referral for [facility].       Goals of Care Treatment Preferences:             Significant Diagnostic Studies: N/A    Pending Diagnostic Studies:       None          No new Assessment & Plan notes have been filed under this hospital service since the last note was generated.  Service: Interventional Cardiology      Discharged Condition: good    Follow Up:  [facility]    Patient Instructions:   No discharge procedures on file.  Medications:  Reconciled Home Medications:      Medication List        CONTINUE taking these medications      amLODIPine 5 MG tablet  Commonly known as: NORVASC  Take 5 mg by mouth once daily.     aspirin 81 MG EC tablet  Commonly known as: ECOTRIN  Take 81 mg by mouth once daily.     atorvastatin 40 MG tablet  Commonly known as: LIPITOR  atorvastatin 40 mg tablet   1 po qd     BLOOD GLUCOSE TEST Strp  Generic drug: blood sugar diagnostic  by Misc.(Non-Drug; Combo Route) route.     clopidogreL 75 mg tablet  Commonly known as: PLAVIX  Take 75 mg by mouth once daily.     cycloSPORINE 0.05 % ophthalmic emulsion  Commonly known as: RESTASIS  1 drop 2 (two) times daily.     dicyclomine 20 mg tablet  Commonly known as: BENTYL  Take 20 mg by mouth every 6 (six)  hours.     dorzolamide 2 % ophthalmic solution  Commonly known as: TRUSOPT  1 drop 3 (three) times daily.     esomeprazole 20 MG capsule  Commonly known as: NEXIUM  Take 20 mg by mouth before breakfast.     ezetimibe 10 mg tablet  Commonly known as: ZETIA  Take 10 mg by mouth once daily.     isosorbide mononitrate 30 MG 24 hr tablet  Commonly known as: IMDUR  Take 60 mg by mouth once daily.     levocetirizine 5 MG tablet  Commonly known as: XYZAL  Take 5 mg by mouth every evening.     lisinopriL-hydrochlorothiazide 10-12.5 mg per tablet  Commonly known as: PRINZIDE,ZESTORETIC  lisinopril 10 mg-hydrochlorothiazide 12.5 mg tablet     metoprolol tartrate 25 MG tablet  Commonly known as: LOPRESSOR  Take 50 mg by mouth once daily. HALF     montelukast 10 mg tablet  Commonly known as: SINGULAIR  Take by mouth every evening.     MOUNJARO 2.5 mg/0.5 mL Pnij  Generic drug: tirzepatide  Inject into the skin every 7 days.     nitroGLYCERIN 0.4 MG SL tablet  Commonly known as: NITROSTAT  Place 0.4 mg under the tongue every 5 (five) minutes as needed for Chest pain.     ondansetron 4 MG Tbdl  Commonly known as: ZOFRAN-ODT  Take 4 mg by mouth once.     RANEXA 500 MG Tb12  Generic drug: ranolazine  Ranexa 500 mg tablet,extended release   Take 1 tablet twice a day by oral route.     TOPROL XL 50 MG 24 hr tablet  Generic drug: metoprolol succinate  Toprol XL 50 mg tablet,extended release   Take 1 tablet every day by oral route.     traZODone 100 MG tablet  Commonly known as: DESYREL  Take 100 mg by mouth every evening.              Time spent on the discharge of patient: 20 minutes    Amandeep Lynn MD  Interventional Cardiology  [facility]

## 2024-05-01 NOTE — HOSPITAL COURSE
Patient brought for planned RHC that was consistent with group 2 PH. She tolerated the procedure well and there were no complications. She was then sent home with referral for HTS.

## 2024-05-02 ENCOUNTER — TELEPHONE (OUTPATIENT)
Dept: TRANSPLANT | Facility: CLINIC | Age: 74
End: 2024-05-02

## 2024-05-02 ENCOUNTER — TELEPHONE (OUTPATIENT)
Dept: CARDIOLOGY | Facility: CLINIC | Age: 74
End: 2024-05-02
Payer: MEDICARE

## 2024-05-02 NOTE — TELEPHONE ENCOUNTER
Message sent to HF clinic to assist with appointment after a referral placed.  Pt needs assistance with NP appt s/p angiogram.  Needs Eval Heart failure with preserved H.F.

## 2024-05-03 ENCOUNTER — TELEPHONE (OUTPATIENT)
Dept: CARDIOLOGY | Facility: CLINIC | Age: 74
End: 2024-05-03
Payer: MEDICARE

## 2024-05-03 DIAGNOSIS — I25.118 CORONARY ARTERY DISEASE OF NATIVE ARTERY OF NATIVE HEART WITH STABLE ANGINA PECTORIS: Primary | ICD-10-CM

## 2024-05-03 NOTE — TELEPHONE ENCOUNTER
Contact to Dr Colón office, request patient contact for referral appointment.  Re-entry of referral to heart failure department as patient has not received a call.  Pt waiting contact call for appointment. myles Pizarro

## 2024-05-09 ENCOUNTER — TELEPHONE (OUTPATIENT)
Dept: CARDIOLOGY | Facility: CLINIC | Age: 74
End: 2024-05-09
Payer: MEDICARE

## 2024-05-09 NOTE — TELEPHONE ENCOUNTER
Patient calling RE: referral to [facility].  Notification appointment was made on her behalf with Dr renteria.  Appointment letter mailed to patient home.

## 2024-05-15 DIAGNOSIS — R06.82 TACHYPNEA: Primary | ICD-10-CM

## 2024-05-15 DIAGNOSIS — Z79.899 POLYPHARMACY: ICD-10-CM

## 2024-05-15 DIAGNOSIS — I27.9 CHRONIC PULMONARY HEART DISEASE: ICD-10-CM

## 2024-05-28 ENCOUNTER — HOSPITAL ENCOUNTER (OUTPATIENT)
Dept: PULMONOLOGY | Facility: CLINIC | Age: 74
Discharge: HOME | End: 2024-05-28
Payer: MEDICARE

## 2024-05-28 ENCOUNTER — OFFICE VISIT (OUTPATIENT)
Dept: TRANSPLANT | Facility: CLINIC | Age: 74
End: 2024-05-28
Payer: MEDICARE

## 2024-05-28 ENCOUNTER — LAB VISIT (OUTPATIENT)
Dept: LAB | Facility: HOSPITAL | Age: 74
End: 2024-05-28
Attending: INTERNAL MEDICINE
Payer: MEDICARE

## 2024-05-28 VITALS
SYSTOLIC BLOOD PRESSURE: 120 MMHG | WEIGHT: 175.69 LBS | HEIGHT: 62 IN | OXYGEN SATURATION: 96 % | BODY MASS INDEX: 32.33 KG/M2 | DIASTOLIC BLOOD PRESSURE: 78 MMHG | HEART RATE: 76 BPM

## 2024-05-28 VITALS — HEIGHT: 62 IN | WEIGHT: 173 LBS | BODY MASS INDEX: 31.83 KG/M2

## 2024-05-28 DIAGNOSIS — Z79.899 POLYPHARMACY: ICD-10-CM

## 2024-05-28 DIAGNOSIS — E78.5 DYSLIPIDEMIA: ICD-10-CM

## 2024-05-28 DIAGNOSIS — I25.118 CORONARY ARTERY DISEASE OF NATIVE ARTERY OF NATIVE HEART WITH STABLE ANGINA PECTORIS: Primary | ICD-10-CM

## 2024-05-28 DIAGNOSIS — I27.20 PULMONARY HYPERTENSION: ICD-10-CM

## 2024-05-28 DIAGNOSIS — R06.82 TACHYPNEA: ICD-10-CM

## 2024-05-28 DIAGNOSIS — E11.9 TYPE 2 DIABETES MELLITUS WITHOUT COMPLICATION, WITHOUT LONG-TERM CURRENT USE OF INSULIN: ICD-10-CM

## 2024-05-28 DIAGNOSIS — I27.9 CHRONIC PULMONARY HEART DISEASE: ICD-10-CM

## 2024-05-28 LAB
ALBUMIN SERPL BCP-MCNC: 3.6 G/DL (ref 3.5–5.2)
ALP SERPL-CCNC: 80 U/L (ref 55–135)
ALT SERPL W/O P-5'-P-CCNC: 11 U/L (ref 10–44)
ANION GAP SERPL CALC-SCNC: 9 MMOL/L (ref 8–16)
AST SERPL-CCNC: 13 U/L (ref 10–40)
BASOPHILS # BLD AUTO: 0.05 K/UL (ref 0–0.2)
BASOPHILS NFR BLD: 0.5 % (ref 0–1.9)
BILIRUB SERPL-MCNC: 0.5 MG/DL (ref 0.1–1)
BNP SERPL-MCNC: 76 PG/ML (ref 0–99)
BUN SERPL-MCNC: 17 MG/DL (ref 8–23)
CALCIUM SERPL-MCNC: 9.5 MG/DL (ref 8.7–10.5)
CHLORIDE SERPL-SCNC: 103 MMOL/L (ref 95–110)
CO2 SERPL-SCNC: 28 MMOL/L (ref 23–29)
CREAT SERPL-MCNC: 0.8 MG/DL (ref 0.5–1.4)
DIFFERENTIAL METHOD BLD: ABNORMAL
EOSINOPHIL # BLD AUTO: 0.2 K/UL (ref 0–0.5)
EOSINOPHIL NFR BLD: 1.4 % (ref 0–8)
ERYTHROCYTE [DISTWIDTH] IN BLOOD BY AUTOMATED COUNT: 12.9 % (ref 11.5–14.5)
EST. GFR  (NO RACE VARIABLE): >60 ML/MIN/1.73 M^2
GLUCOSE SERPL-MCNC: 160 MG/DL (ref 70–110)
HCT VFR BLD AUTO: 36.8 % (ref 37–48.5)
HGB BLD-MCNC: 12.3 G/DL (ref 12–16)
IMM GRANULOCYTES # BLD AUTO: 0.03 K/UL (ref 0–0.04)
IMM GRANULOCYTES NFR BLD AUTO: 0.3 % (ref 0–0.5)
LYMPHOCYTES # BLD AUTO: 2.8 K/UL (ref 1–4.8)
LYMPHOCYTES NFR BLD: 25.2 % (ref 18–48)
MAGNESIUM SERPL-MCNC: 1.5 MG/DL (ref 1.6–2.6)
MCH RBC QN AUTO: 30.6 PG (ref 27–31)
MCHC RBC AUTO-ENTMCNC: 33.4 G/DL (ref 32–36)
MCV RBC AUTO: 92 FL (ref 82–98)
MONOCYTES # BLD AUTO: 0.8 K/UL (ref 0.3–1)
MONOCYTES NFR BLD: 7.1 % (ref 4–15)
NEUTROPHILS # BLD AUTO: 7.3 K/UL (ref 1.8–7.7)
NEUTROPHILS NFR BLD: 65.5 % (ref 38–73)
NRBC BLD-RTO: 0 /100 WBC
PLATELET # BLD AUTO: 296 K/UL (ref 150–450)
PMV BLD AUTO: 10.4 FL (ref 9.2–12.9)
POTASSIUM SERPL-SCNC: 4 MMOL/L (ref 3.5–5.1)
PROT SERPL-MCNC: 7.1 G/DL (ref 6–8.4)
RBC # BLD AUTO: 4.02 M/UL (ref 4–5.4)
SODIUM SERPL-SCNC: 140 MMOL/L (ref 136–145)
WBC # BLD AUTO: 11.04 K/UL (ref 3.9–12.7)

## 2024-05-28 PROCEDURE — 83735 ASSAY OF MAGNESIUM: CPT | Performed by: INTERNAL MEDICINE

## 2024-05-28 PROCEDURE — 1159F MED LIST DOCD IN RCRD: CPT | Mod: CPTII,S$GLB,, | Performed by: INTERNAL MEDICINE

## 2024-05-28 PROCEDURE — 1101F PT FALLS ASSESS-DOCD LE1/YR: CPT | Mod: CPTII,S$GLB,, | Performed by: INTERNAL MEDICINE

## 2024-05-28 PROCEDURE — 3074F SYST BP LT 130 MM HG: CPT | Mod: CPTII,S$GLB,, | Performed by: INTERNAL MEDICINE

## 2024-05-28 PROCEDURE — 99999 PR PBB SHADOW E&M-EST. PATIENT-LVL IV: CPT | Mod: PBBFAC,,, | Performed by: INTERNAL MEDICINE

## 2024-05-28 PROCEDURE — 3008F BODY MASS INDEX DOCD: CPT | Mod: CPTII,S$GLB,, | Performed by: INTERNAL MEDICINE

## 2024-05-28 PROCEDURE — 99205 OFFICE O/P NEW HI 60 MIN: CPT | Mod: S$GLB,,, | Performed by: INTERNAL MEDICINE

## 2024-05-28 PROCEDURE — 3078F DIAST BP <80 MM HG: CPT | Mod: CPTII,S$GLB,, | Performed by: INTERNAL MEDICINE

## 2024-05-28 PROCEDURE — 94618 PULMONARY STRESS TESTING: CPT | Mod: S$GLB,,, | Performed by: INTERNAL MEDICINE

## 2024-05-28 PROCEDURE — 83880 ASSAY OF NATRIURETIC PEPTIDE: CPT | Performed by: INTERNAL MEDICINE

## 2024-05-28 PROCEDURE — 85025 COMPLETE CBC W/AUTO DIFF WBC: CPT | Performed by: INTERNAL MEDICINE

## 2024-05-28 PROCEDURE — 3288F FALL RISK ASSESSMENT DOCD: CPT | Mod: CPTII,S$GLB,, | Performed by: INTERNAL MEDICINE

## 2024-05-28 PROCEDURE — 1126F AMNT PAIN NOTED NONE PRSNT: CPT | Mod: CPTII,S$GLB,, | Performed by: INTERNAL MEDICINE

## 2024-05-28 PROCEDURE — 36415 COLL VENOUS BLD VENIPUNCTURE: CPT | Performed by: INTERNAL MEDICINE

## 2024-05-28 PROCEDURE — 80053 COMPREHEN METABOLIC PANEL: CPT | Performed by: INTERNAL MEDICINE

## 2024-05-28 RX ORDER — DIAZEPAM 2 MG/1
2 TABLET ORAL EVERY 8 HOURS PRN
COMMUNITY

## 2024-05-28 RX ORDER — BUTALBITAL, ACETAMINOPHEN AND CAFFEINE 50; 325; 40 MG/1; MG/1; MG/1
1 TABLET ORAL EVERY 4 HOURS PRN
COMMUNITY

## 2024-05-28 RX ORDER — METOPROLOL SUCCINATE 200 MG/1
200 TABLET, EXTENDED RELEASE ORAL DAILY
COMMUNITY

## 2024-05-28 NOTE — PROCEDURES
Maureen Limon is a 74 y.o.  female patient, who presents for a 6 minute walk test ordered by MD Maritza.  The diagnosis is Qualify for Oxygen.  The patient's BMI is 31.6 kg/m2.  Predicted distance (lower limit of normal) is 255.23 meters.      Test Results:    The test was completed without stopping.  The total time walked was 360 seconds.  During walking, the patient reported:  Dyspnea.  The patient used no assistive devices during testing.     05/28/2024---------Distance: 274.32 meters (900 feet)     O2 Sat % Supplemental Oxygen Heart Rate Blood Pressure Sánchez Scale   Pre-exercise  (Resting) 96 % Room Air 87 bpm 137/76 mmHg 0   During Exercise 90 % Room Air 91 bpm 147/71 mmHg 1   Post-exercise  (Recovery) 91 % Room Air  85 bpm       Recovery Time: 65 seconds    Performing nurse/tech: Morris SEQUEIRA      PREVIOUS STUDY:   The patient has not had a previous study.      CLINICAL INTERPRETATION:  Six minute walk distance is 274.32 meters (900 feet) with very light dyspnea.  During exercise, there was significant desaturation while breathing room air.  Both blood pressure and heart rate remained stable with walking.  The patient did not report non-pulmonary symptoms during exercise.  Significant exercise impairment is likely due to desaturation and subjective symptoms.  The patient did complete the study, walking 360 seconds of the 360 second test.  No previous study performed.  Based upon age and body mass index, exercise capacity may be normal.

## 2024-05-28 NOTE — PROGRESS NOTES
Subjective   Patient ID:  Maureen Limon is a 74 y.o. female who presents for evaluation of PH    74 YO F w/ CAD s/p CABG x 4 (LIMA to LAD, VG to OM, VG to diag, VG to RCA), HTN, HLD, DM who presents for evaluation of PH.    She sees providers with [facility] and recently saw Dr. Alexander Robertson MD for exertional chest pain. She had a LHC done 1/2024 which showed patent CABG grafts and stents. RHC was done on 5/1/24 and results as noted below.    When talking with her, her primary complaint is chest discomfort.  She says that she gets these episodes of chest pain which are retrosternal, as well as left-sided of her chest.  It sometimes radiates to her left arm and her back, and at times can originate from her neck.  She describes it sometimes as a shooting pain, and other times as a pressure-like sensation.  She says that it is intermittent occurring approximately 2-4 times per week, and the episodes last 3-4 hours.  The episodes are usually occur in the early morning between three and 4:00 a.m..  She is prescribed sublingual nitroglycerin and when she has taken this it does not help.  She can not think of any specific aggravating or alleviating factors, when asked specifically she denies it being exertional.  She does note some chronic bilateral lower extremity swelling.  Denies any palpitations, presyncope, syncope, PND, or orthopnea.  She also has a history of heartburn, and is on Nexium, but says that the Nexium does not help with this specific chest pain.    TTE 10/4/23  Interpretation Summary   Aortic Valve calcified.   Definity UEA used to evaluate.   Ejection Fraction = 55-60%.   Grade 1 Diastolic Dysfunction     Measurements   IVSd: 0.94 cm                          LVIDd: 4.6 cm   LVPWd: 0.94 cm                         LVIDs: 3.1 cm   LVOT diam: 2.0 cm                      LA dimension: 4.4 cm     MMode/2D Measurements & Calculations   FS: 32.9 %                          LVOT area: 3.1 cm2    EDV(Teich): 99.1 ml   ESV(Teich): 38.2 ml   EF(Teich): 61.5 %   LVLd ap4: 6.9 cm                    EDV(MOD-sp2): 75.1 ml   EDV(MOD-sp4): 74.0 ml   LVLs ap4: 5.9 cm   ESV(MOD-sp4): 28.6 ml   EF(MOD-sp4): 61.4 %   SV(MOD-sp4): 45.4 ml                Ao Sinus Diam_phl: 3.1 cm           ______________________________________________________________________________   Aortic R-R: 0.83 sec     Time Measurements   Aortic HR: 72.0 BPM                  MV dec time: 0.22 sec     Doppler Measurements & Calculations   MV E max lashanda: 66.4 cm/sec           MV P1/2t max lashanda: 68.7 cm/sec   MV A max lashanda: 88.0 cm/sec           MV P1/2t: 73.5 msec   MV E/A: 0.75                        MVA(P1/2t): 3.0 cm2                                       MV dec slope: 273.6 cm/sec2   Ao V2 max: 131.0 cm/sec             LV V1 max P.8 mmHg   Ao max P.1 mmHg                 LV V1 mean P.0 mmHg   Ao V2 mean: 87.7 cm/sec             LV V1 max: 83.2 cm/sec   Ao mean PG: 3.7 mmHg                LV V1 mean: 56.3 cm/sec   Ao V2 VTI: 26.9 cm                  LV V1 VTI: 18.2 cm   ROCIO(I,D): 2.1 cm2   ROCIO(V,D): 1.9 cm2   CO(LVOT): 4.0 l/min                 AV VR_phl: 0.64   CI(LVOT): 2.3 l/min/m2   SV(LVOT): 55.6 ml             ______________________________________________________________________________   ROCIO(VTI)/BSA_phl: 1.2               MV P1/2t-pr_phl: 73.5 msec             ______________________________________________________________________________   RV S Vel_phl: 8.4 cm/sec     LEFT VENTRICLE        o The left ventricle is normal in size.      o Definity UEA used to evaluate.      o Ejection Fraction = 55-60%.      o LV wall thickness grossly normal.     DIASTOLOGY       o Lateral e'= '10' cm/s.      o Septal e'= '6' cm/s.      o E/e' ='9'.      o LAESV index = '29' ml/m2.      o Grade 1 Diastolic Dysfunction.     RIGHT VENTRICLE        o The right ventricle is not well visualized.     ATRIA       o The left atrial size is normal.       o Right Atrium Grossly Normal.     MITRAL VALVE        o Mitral valve leaflets appear to be mildly thickened.     TRICUSPID VALVE        o The tricuspid valve is not well visualized, but is grossly normal.      o No tricuspid regurgitation.     AORTIC VALVE        o Aortic Valve calcified.      o Normal tricuspid aortic valve.      o No aortic regurgitation is present.      o There is no aortic valvular stenosis.     PULMONIC VALVE        o The pulmonic valve is not well seen, but is grossly normal.      o Mild pulmonic valvular regurgitation.     GREAT VESSELS        o The aortic root is normal size.     PERICARDIUM/PLEURAL EFFUSION        o There is no pericardial effusion.      o There is no pleural effusion.     [facility] 1/22/24  GRAFTS:   1. The mammary artery to LAD is widely patent and perfuses the   gbp-ce-ardidt LAD.   2. The vein graft to the diagonal is widely patent. The diagonal appears   to be occluded at the ostium.   3. The vein graft to the obtuse marginal branch is patent. It perfuses the   entire circumflex system.   4. The vein graft to the right coronary artery is a large graft and it is   widely patent. It perfuses the posterior descending artery and multiple   posterolateral branches.     CONCLUSIONS:   1. Severe multivessel coronary artery disease.   2. Four patent grafts as outlined above.    FINDINGS:   1. Hemodynamics: Left ventricular pressure is 144/14, aortic pressure   140/65. No pullback gradient.   2. Ventriculography showed normal left ventricular function, ejection   fraction of 65%.    RHC 5/1/24  RA: 3 RV: 30 PA: 30/ 10/ 16 PWP: 33 .   Cardiac output was 6.09  by Nataliia. Cardiac index is 3.46 L/min/m2.     CTA Chest 5/17/24  IMPRESSION:   1. Negative for pulmonary embolus, aneurysm or dissection.   2.  Interval development of multiple peripheral groundglass opacities throughout the lungs.  Differential considerations would include interstitial infectious process such as atypical viral  pneumonia versus active alveolitis of an underlying interstitial    lung disease and clinical correlation is advised.   3.  Numerous stable findings as noted above.       Review of Systems   Constitutional: Negative for chills and fever.   HENT:  Negative for hearing loss.    Eyes:  Negative for visual disturbance.   Cardiovascular:  Positive for chest pain. Negative for dyspnea on exertion, irregular heartbeat, leg swelling, orthopnea, palpitations, paroxysmal nocturnal dyspnea and syncope.   Respiratory:  Negative for cough and shortness of breath.    Musculoskeletal:  Negative for muscle weakness.   Gastrointestinal:  Negative for diarrhea, nausea and vomiting.   Neurological:  Negative for focal weakness.   Psychiatric/Behavioral:  Negative for memory loss.           Objective   Vitals:    05/28/24 1136   BP: 120/78   Pulse: 76       Physical Exam  Constitutional:       Appearance: Normal appearance.   HENT:      Head: Atraumatic.   Eyes:      Extraocular Movements: Extraocular movements intact.   Cardiovascular:      Rate and Rhythm: Normal rate and regular rhythm.      Pulses: Normal pulses.      Heart sounds: Normal heart sounds.      Comments: JVP not visible at 45 degrees.  Pulmonary:      Breath sounds: Rales present.   Abdominal:      Palpations: Abdomen is soft.      Tenderness: There is no abdominal tenderness.   Musculoskeletal:         General: Normal range of motion.      Right lower leg: No edema.      Left lower leg: No edema.   Neurological:      General: No focal deficit present.      Mental Status: She is alert and oriented to person, place, and time.            Assessment and Plan     1. Coronary artery disease of native artery of native heart with stable angina pectoris    2. Pulmonary hypertension    3. Dyslipidemia    4. Type 2 diabetes mellitus without complication, without long-term current use of insulin        Plan:  - she comes in today for evaluation of possible pulmonary hypertension.   Her workup up until this time was reviewed with results as noted above.  - in short she does not have any evidence of either pulmonary hypertension or heart failure with preserved ejection fraction.  Her recent right heart catheterization demonstrated normal right atrial pressure of 3, and normal pulmonary artery pressure with a mean pulmonary artery pressure of 16 (normal < 20).  - the wedge pressure was measured at 33 mm Hg, but this is likely erroneous as it is impossible to have a pulmonary capillary wedge pressure that is higher than the mean pulmonary artery pressure let alone the PA systolic pressure again which was measured as normal at 30 mm Hg.  Her prior left heart catheterizations have demonstrated normal LVEDP of between 9 and 14, and her echocardiogram shows normal left atrial size with grade 1 filling, and between these findings and normal BNP on blood work today, there is no evidence of heart failure with preserved ejection fraction.  - most recent angiogram also demonstrates patent grafts.  - chest discomfort is atypical for angina as it is nonexertional, frequently associated with upper neck, upper back, and arm pain.  Would recommend exploring noncardiac causes of arm/chest pain including cervical radiculopathy.  When asked specifically she says that she does have a history of chronic upper back and neck pain.  Similarly, her CT scans done at the outside hospital were suggestive of alveolitis, and they were planning on pulmonary function testing and outpatient pulmonology follow-up, and I recommended that she pursue this as well.    Continue follow up with her general cardiology team

## 2024-06-17 ENCOUNTER — TELEPHONE (OUTPATIENT)
Dept: CARDIOLOGY | Facility: CLINIC | Age: 74
End: 2024-06-17
Payer: MEDICARE

## 2024-06-17 NOTE — TELEPHONE ENCOUNTER
Attempt to contact patient, friendly reminder of appointment eval needed with Heart failure clinic s/p angiogram, no answer, Left detailed message for patient.  Noted original appointment made for patient was cancelled.

## (undated) DEVICE — LINE 60IN PRESSURE MON.

## (undated) DEVICE — COVER PROBE US 5.5X58L NON LTX

## (undated) DEVICE — TRAY CATH LAB OMC

## (undated) DEVICE — TRANSDUCER ADULT DISP

## (undated) DEVICE — KIT MICROINTRO 4F .018X40X7CM

## (undated) DEVICE — SHEATH INTRODUCER 7FR 11CM

## (undated) DEVICE — PAD DEFIB CADENCE ADULT R2

## (undated) DEVICE — STOPCOCK 3-WAY

## (undated) DEVICE — CATH SWAN GANZ STND 7FR